# Patient Record
Sex: MALE | Race: WHITE | Employment: FULL TIME | URBAN - METROPOLITAN AREA
[De-identification: names, ages, dates, MRNs, and addresses within clinical notes are randomized per-mention and may not be internally consistent; named-entity substitution may affect disease eponyms.]

---

## 2020-11-25 ENCOUNTER — OFFICE VISIT (OUTPATIENT)
Dept: PRIMARY CARE CLINIC | Age: 30
End: 2020-11-25

## 2020-11-25 PROCEDURE — 99211 OFF/OP EST MAY X REQ PHY/QHP: CPT | Performed by: NURSE PRACTITIONER

## 2020-11-25 NOTE — PROGRESS NOTES
Roel Taylor received a viral test for COVID-19. They were educated on isolation and quarantine as appropriate. For any symptoms, they were directed to seek care from their PCP, given contact information to establish with a doctor, directed to an urgent care or the emergency room.

## 2020-11-28 LAB — SARS-COV-2, NAA: NOT DETECTED

## 2025-03-29 ENCOUNTER — APPOINTMENT (OUTPATIENT)
Dept: CT IMAGING | Age: 35
End: 2025-03-29
Payer: COMMERCIAL

## 2025-03-29 ENCOUNTER — HOSPITAL ENCOUNTER (EMERGENCY)
Age: 35
Discharge: HOME | End: 2025-03-29
Attending: STUDENT IN AN ORGANIZED HEALTH CARE EDUCATION/TRAINING PROGRAM
Payer: COMMERCIAL

## 2025-03-29 ENCOUNTER — APPOINTMENT (OUTPATIENT)
Dept: GENERAL RADIOLOGY | Age: 35
End: 2025-03-29
Payer: COMMERCIAL

## 2025-03-29 VITALS
HEIGHT: 72 IN | DIASTOLIC BLOOD PRESSURE: 96 MMHG | SYSTOLIC BLOOD PRESSURE: 134 MMHG | TEMPERATURE: 97.9 F | WEIGHT: 315 LBS | OXYGEN SATURATION: 96 % | BODY MASS INDEX: 42.66 KG/M2 | HEART RATE: 94 BPM | RESPIRATION RATE: 16 BRPM

## 2025-03-29 DIAGNOSIS — S42.214A CLOSED NONDISPLACED FRACTURE OF SURGICAL NECK OF RIGHT HUMERUS, UNSPECIFIED FRACTURE MORPHOLOGY, INITIAL ENCOUNTER: Primary | ICD-10-CM

## 2025-03-29 DIAGNOSIS — S52.571A OTHER CLOSED INTRA-ARTICULAR FRACTURE OF DISTAL END OF RIGHT RADIUS, INITIAL ENCOUNTER: ICD-10-CM

## 2025-03-29 PROCEDURE — 73100 X-RAY EXAM OF WRIST: CPT

## 2025-03-29 PROCEDURE — 73030 X-RAY EXAM OF SHOULDER: CPT

## 2025-03-29 PROCEDURE — 99284 EMERGENCY DEPT VISIT MOD MDM: CPT

## 2025-03-29 PROCEDURE — 70450 CT HEAD/BRAIN W/O DYE: CPT

## 2025-03-29 PROCEDURE — 6370000000 HC RX 637 (ALT 250 FOR IP): Performed by: STUDENT IN AN ORGANIZED HEALTH CARE EDUCATION/TRAINING PROGRAM

## 2025-03-29 PROCEDURE — 72125 CT NECK SPINE W/O DYE: CPT

## 2025-03-29 PROCEDURE — 29125 APPL SHORT ARM SPLINT STATIC: CPT

## 2025-03-29 RX ORDER — OXYCODONE HYDROCHLORIDE 10 MG/1
10 TABLET ORAL ONCE
Refills: 0 | Status: COMPLETED | OUTPATIENT
Start: 2025-03-29 | End: 2025-03-29

## 2025-03-29 RX ORDER — ONDANSETRON 4 MG/1
4 TABLET, ORALLY DISINTEGRATING ORAL ONCE
Status: COMPLETED | OUTPATIENT
Start: 2025-03-29 | End: 2025-03-29

## 2025-03-29 RX ORDER — MELOXICAM 15 MG/1
15 TABLET ORAL DAILY
Qty: 7 TABLET | Refills: 0 | Status: ON HOLD | OUTPATIENT
Start: 2025-03-29 | End: 2025-04-02 | Stop reason: HOSPADM

## 2025-03-29 RX ADMIN — ONDANSETRON 4 MG: 4 TABLET, ORALLY DISINTEGRATING ORAL at 22:11

## 2025-03-29 RX ADMIN — OXYCODONE HYDROCHLORIDE 10 MG: 10 TABLET ORAL at 22:11

## 2025-03-29 ASSESSMENT — PAIN DESCRIPTION - DESCRIPTORS: DESCRIPTORS: ACHING

## 2025-03-29 ASSESSMENT — LIFESTYLE VARIABLES
HOW MANY STANDARD DRINKS CONTAINING ALCOHOL DO YOU HAVE ON A TYPICAL DAY: 1 OR 2
HOW OFTEN DO YOU HAVE A DRINK CONTAINING ALCOHOL: MONTHLY OR LESS

## 2025-03-29 ASSESSMENT — PAIN SCALES - GENERAL
PAINLEVEL_OUTOF10: 2
PAINLEVEL_OUTOF10: 7
PAINLEVEL_OUTOF10: 3

## 2025-03-29 ASSESSMENT — PAIN DESCRIPTION - PAIN TYPE: TYPE: ACUTE PAIN

## 2025-03-29 ASSESSMENT — PAIN DESCRIPTION - LOCATION
LOCATION: WRIST;SHOULDER
LOCATION: ARM

## 2025-03-29 ASSESSMENT — PAIN DESCRIPTION - ORIENTATION
ORIENTATION: RIGHT;LEFT
ORIENTATION: RIGHT;LEFT

## 2025-03-29 ASSESSMENT — PAIN - FUNCTIONAL ASSESSMENT: PAIN_FUNCTIONAL_ASSESSMENT: 0-10
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
## 2025-03-30 NOTE — ED NOTES
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
## 2025-03-30 NOTE — DISCHARGE INSTRUCTIONS
Thank you for trusting us with your care, it was nice to meet you, as we discussed please follow-up with orthopedic surgery at the number on your discharge papers.  Return to the ED if you have worsening pain, loss of sensation or numbness or other symptoms you find concerning for emergent illness or injury.  A prescription has been sent to your pharmacy for Mobic, you can take this once a day for pain, Mobic is an NSAID, do not take other NSAIDs such as ibuprofen or naproxen while taking Mobic.  You can take Tylenol for breakthrough pain.

## 2025-03-30 NOTE — ED NOTES
Splint completed per RN and ED tech and Dr. Subramanian checked splint. Capillary refill brisk, no loss of sensation noted. Sling and swathe applied per RN and ED tech.

## 2025-03-31 ENCOUNTER — TELEPHONE (OUTPATIENT)
Dept: ORTHOPEDIC SURGERY | Age: 35
End: 2025-03-31

## 2025-03-31 ENCOUNTER — ANESTHESIA EVENT (OUTPATIENT)
Dept: OPERATING ROOM | Age: 35
End: 2025-03-31
Payer: COMMERCIAL

## 2025-03-31 ENCOUNTER — PREP FOR PROCEDURE (OUTPATIENT)
Dept: ORTHOPEDIC SURGERY | Age: 35
End: 2025-03-31

## 2025-03-31 DIAGNOSIS — S52.502A CLOSED FRACTURE OF DISTAL END OF LEFT RADIUS, UNSPECIFIED FRACTURE MORPHOLOGY, INITIAL ENCOUNTER: Primary | ICD-10-CM

## 2025-03-31 DIAGNOSIS — S42.201A CLOSED FRACTURE OF PROXIMAL END OF RIGHT HUMERUS, UNSPECIFIED FRACTURE MORPHOLOGY, INITIAL ENCOUNTER: Primary | ICD-10-CM

## 2025-03-31 PROBLEM — S62.102A LEFT WRIST FRACTURE: Status: ACTIVE | Noted: 2025-03-31

## 2025-03-31 RX ORDER — ATORVASTATIN CALCIUM 80 MG/1
80 TABLET, FILM COATED ORAL DAILY
COMMUNITY
Start: 2025-01-20

## 2025-03-31 RX ORDER — OXYCODONE HYDROCHLORIDE 5 MG/1
5-10 TABLET ORAL EVERY 6 HOURS PRN
Qty: 30 TABLET | Refills: 0 | Status: SHIPPED | OUTPATIENT
Start: 2025-03-31 | End: 2025-04-07

## 2025-03-31 RX ORDER — TIRZEPATIDE 12.5 MG/.5ML
INJECTION, SOLUTION SUBCUTANEOUS
COMMUNITY
Start: 2025-01-18

## 2025-03-31 NOTE — PROGRESS NOTES
Spoke with Dr Jesus , he is okay with last dose of Mounjaro being 3/30 since this is emergent surgery.    [facility] PRE-OPERATIVE INSTRUCTIONS    Day of Procedure:  4/2              Arrival time:   1240             Surgery time:1440    Take the following medications with a sip of water:  Follow your MD/Surgeons pre-procedure instructions regarding your medications     Do not eat or drink anything after 12:00 midnight prior to your surgery.  This includes water, chewing gum, mints and ice chips.   You may brush your teeth and gargle the morning of your surgery, but do not swallow the water.     Please see your family doctor/pediatrician for a history and physical and/or concerning medications.   Bring any test results/reports from your physicians office.   If you are under the care of a heart doctor or specialist doctor, please be aware that you may be asked to see them for clearance.    You may be asked to stop blood thinners such as Coumadin, Plavix, Fragmin, Lovenox, etc., or any anti-inflammatories such as:  Aspirin, Ibuprofen, Advil, Naproxen prior to your surgery.    We also ask that you stop any over the counter medications such as fish oil, vitamin E, glucosamine, garlic, Multivitamins, COQ 10, etc.    We ask that you do not smoke 24 hours prior to surgery.  We ask that you do not  drink any alcoholic beverages 24 hours prior to surgery     You must make arrangements for a responsible adult to take you home after your surgery.    For your safety, you will not be allowed to leave alone or drive yourself home.  Your surgery will be cancelled if you do not have a ride home.     Also for your safety, you must have someone stay with you the first 24 hours after your surgery.     A parent or legal guardian must accompany a child scheduled for surgery and plan to stay at the hospital until the child is discharged.    Please do not bring other children with you.    For your comfort, please wear simple loose

## 2025-03-31 NOTE — TELEPHONE ENCOUNTER
I scheduled the patient for an appointment with Dr. Gayle tomorrow morning. The patient has a left wrist and right shoulder fracture. The ER only gave him Meloxicam for pain. The patient is asking for pain medication. I informed him I would ask Dr. Gayle.     Message sent to Dr. Gayle.

## 2025-04-01 ENCOUNTER — OFFICE VISIT (OUTPATIENT)
Dept: ORTHOPEDIC SURGERY | Age: 35
End: 2025-04-01

## 2025-04-01 VITALS — HEIGHT: 72 IN | BODY MASS INDEX: 42.66 KG/M2 | WEIGHT: 315 LBS

## 2025-04-01 DIAGNOSIS — S52.502A CLOSED FRACTURE OF DISTAL END OF LEFT RADIUS, UNSPECIFIED FRACTURE MORPHOLOGY, INITIAL ENCOUNTER: Primary | ICD-10-CM

## 2025-04-01 DIAGNOSIS — S42.201A CLOSED FRACTURE OF PROXIMAL END OF RIGHT HUMERUS, UNSPECIFIED FRACTURE MORPHOLOGY, INITIAL ENCOUNTER: ICD-10-CM
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
## 2025-04-02 ENCOUNTER — APPOINTMENT (OUTPATIENT)
Dept: GENERAL RADIOLOGY | Age: 35
End: 2025-04-02
Attending: ORTHOPAEDIC SURGERY
Payer: COMMERCIAL

## 2025-04-02 ENCOUNTER — ANESTHESIA (OUTPATIENT)
Dept: OPERATING ROOM | Age: 35
End: 2025-04-02
Payer: COMMERCIAL

## 2025-04-02 ENCOUNTER — HOSPITAL ENCOUNTER (OUTPATIENT)
Age: 35
Setting detail: OUTPATIENT SURGERY
Discharge: HOME | End: 2025-04-02
Attending: ORTHOPAEDIC SURGERY | Admitting: ORTHOPAEDIC SURGERY
Payer: COMMERCIAL

## 2025-04-02 VITALS
OXYGEN SATURATION: 96 % | HEIGHT: 72 IN | DIASTOLIC BLOOD PRESSURE: 87 MMHG | SYSTOLIC BLOOD PRESSURE: 130 MMHG | WEIGHT: 315 LBS | TEMPERATURE: 99.7 F | RESPIRATION RATE: 18 BRPM | BODY MASS INDEX: 42.66 KG/M2 | HEART RATE: 109 BPM

## 2025-04-02 DIAGNOSIS — S52.502A CLOSED FRACTURE OF DISTAL END OF LEFT RADIUS, UNSPECIFIED FRACTURE MORPHOLOGY, INITIAL ENCOUNTER: ICD-10-CM

## 2025-04-02 LAB
GLUCOSE BLD-MCNC: 127 MG/DL (ref 70–99)
GLUCOSE BLD-MCNC: 131 MG/DL (ref 70–99)
PERFORMED ON: ABNORMAL
PERFORMED ON: ABNORMAL

## 2025-04-02 PROCEDURE — 2580000003 HC RX 258: Performed by: ORTHOPAEDIC SURGERY

## 2025-04-02 PROCEDURE — 2709999900 HC NON-CHARGEABLE SUPPLY: Performed by: ORTHOPAEDIC SURGERY

## 2025-04-02 PROCEDURE — 7100000011 HC PHASE II RECOVERY - ADDTL 15 MIN: Performed by: ORTHOPAEDIC SURGERY

## 2025-04-02 PROCEDURE — 3600000004 HC SURGERY LEVEL 4 BASE: Performed by: ORTHOPAEDIC SURGERY

## 2025-04-02 PROCEDURE — 6360000002 HC RX W HCPCS: Performed by: NURSE ANESTHETIST, CERTIFIED REGISTERED

## 2025-04-02 PROCEDURE — 7100000000 HC PACU RECOVERY - FIRST 15 MIN: Performed by: ORTHOPAEDIC SURGERY

## 2025-04-02 PROCEDURE — 7100000001 HC PACU RECOVERY - ADDTL 15 MIN: Performed by: ORTHOPAEDIC SURGERY

## 2025-04-02 PROCEDURE — 6370000000 HC RX 637 (ALT 250 FOR IP): Performed by: ANESTHESIOLOGY

## 2025-04-02 PROCEDURE — 6360000002 HC RX W HCPCS: Performed by: ORTHOPAEDIC SURGERY

## 2025-04-02 PROCEDURE — 3600000014 HC SURGERY LEVEL 4 ADDTL 15MIN: Performed by: ORTHOPAEDIC SURGERY

## 2025-04-02 PROCEDURE — 3700000001 HC ADD 15 MINUTES (ANESTHESIA): Performed by: ORTHOPAEDIC SURGERY

## 2025-04-02 PROCEDURE — C1713 ANCHOR/SCREW BN/BN,TIS/BN: HCPCS | Performed by: ORTHOPAEDIC SURGERY

## 2025-04-02 PROCEDURE — 2500000003 HC RX 250 WO HCPCS: Performed by: NURSE ANESTHETIST, CERTIFIED REGISTERED

## 2025-04-02 PROCEDURE — 2720000010 HC SURG SUPPLY STERILE: Performed by: ORTHOPAEDIC SURGERY

## 2025-04-02 PROCEDURE — 2500000003 HC RX 250 WO HCPCS: Performed by: ORTHOPAEDIC SURGERY

## 2025-04-02 PROCEDURE — 2580000003 HC RX 258: Performed by: ANESTHESIOLOGY

## 2025-04-02 PROCEDURE — 6360000002 HC RX W HCPCS: Performed by: ANESTHESIOLOGY

## 2025-04-02 PROCEDURE — 3700000000 HC ANESTHESIA ATTENDED CARE: Performed by: ORTHOPAEDIC SURGERY

## 2025-04-02 PROCEDURE — C1769 GUIDE WIRE: HCPCS | Performed by: ORTHOPAEDIC SURGERY

## 2025-04-02 PROCEDURE — 7100000010 HC PHASE II RECOVERY - FIRST 15 MIN: Performed by: ORTHOPAEDIC SURGERY

## 2025-04-02 PROCEDURE — 73100 X-RAY EXAM OF WRIST: CPT

## 2025-04-02 DEVICE — BONE SCREW, FULLY THREADED, T8
Type: IMPLANTABLE DEVICE | Site: WRIST | Status: FUNCTIONAL
Brand: VARIAX

## 2025-04-02 DEVICE — LOCKING SCREW, FULLY THREADED,T8
Type: IMPLANTABLE DEVICE | Site: WRIST | Status: FUNCTIONAL
Brand: VARIAX

## 2025-04-02 DEVICE — VOLAR PLATE INTERMEDIATE LEFT, LONG
Type: IMPLANTABLE DEVICE | Site: WRIST | Status: FUNCTIONAL
Brand: VARIAX

## 2025-04-02 RX ORDER — BUPIVACAINE HYDROCHLORIDE 5 MG/ML
INJECTION, SOLUTION EPIDURAL; INTRACAUDAL; PERINEURAL
Status: DISCONTINUED | OUTPATIENT
Start: 2025-04-02 | End: 2025-04-02 | Stop reason: ALTCHOICE

## 2025-04-02 RX ORDER — KETOROLAC TROMETHAMINE 30 MG/ML
INJECTION, SOLUTION INTRAMUSCULAR; INTRAVENOUS
Status: DISCONTINUED | OUTPATIENT
Start: 2025-04-02 | End: 2025-04-02 | Stop reason: SDUPTHER

## 2025-04-02 RX ORDER — MAGNESIUM HYDROXIDE 1200 MG/15ML
LIQUID ORAL CONTINUOUS PRN
Status: COMPLETED | OUTPATIENT
Start: 2025-04-02 | End: 2025-04-02

## 2025-04-02 RX ORDER — ESMOLOL HYDROCHLORIDE 10 MG/ML
INJECTION INTRAVENOUS
Status: DISCONTINUED | OUTPATIENT
Start: 2025-04-02 | End: 2025-04-02 | Stop reason: SDUPTHER

## 2025-04-02 RX ORDER — OXYCODONE HYDROCHLORIDE 5 MG/1
5 TABLET ORAL PRN
Status: COMPLETED | OUTPATIENT
Start: 2025-04-02 | End: 2025-04-02

## 2025-04-02 RX ORDER — ONDANSETRON 2 MG/ML
INJECTION INTRAMUSCULAR; INTRAVENOUS
Status: DISCONTINUED | OUTPATIENT
Start: 2025-04-02 | End: 2025-04-02 | Stop reason: SDUPTHER

## 2025-04-02 RX ORDER — SODIUM CHLORIDE 9 MG/ML
INJECTION, SOLUTION INTRAVENOUS PRN
Status: DISCONTINUED | OUTPATIENT
Start: 2025-04-02 | End: 2025-04-02 | Stop reason: HOSPADM

## 2025-04-02 RX ORDER — ROCURONIUM BROMIDE 10 MG/ML
INJECTION, SOLUTION INTRAVENOUS
Status: DISCONTINUED | OUTPATIENT
Start: 2025-04-02 | End: 2025-04-02 | Stop reason: SDUPTHER

## 2025-04-02 RX ORDER — SODIUM CHLORIDE 0.9 % (FLUSH) 0.9 %
5-40 SYRINGE (ML) INJECTION PRN
Status: DISCONTINUED | OUTPATIENT
Start: 2025-04-02 | End: 2025-04-02 | Stop reason: HOSPADM

## 2025-04-02 RX ORDER — FENTANYL CITRATE 50 UG/ML
INJECTION, SOLUTION INTRAMUSCULAR; INTRAVENOUS
Status: DISCONTINUED | OUTPATIENT
Start: 2025-04-02 | End: 2025-04-02 | Stop reason: SDUPTHER

## 2025-04-02 RX ORDER — SUCCINYLCHOLINE/SOD CL,ISO/PF 200MG/10ML
SYRINGE (ML) INTRAVENOUS
Status: DISCONTINUED | OUTPATIENT
Start: 2025-04-02 | End: 2025-04-02 | Stop reason: SDUPTHER

## 2025-04-02 RX ORDER — GLYCOPYRROLATE 0.2 MG/ML
INJECTION INTRAMUSCULAR; INTRAVENOUS
Status: DISCONTINUED | OUTPATIENT
Start: 2025-04-02 | End: 2025-04-02 | Stop reason: SDUPTHER

## 2025-04-02 RX ORDER — FENTANYL CITRATE 50 UG/ML
25 INJECTION, SOLUTION INTRAMUSCULAR; INTRAVENOUS EVERY 5 MIN PRN
Status: DISCONTINUED | OUTPATIENT
Start: 2025-04-02 | End: 2025-04-02 | Stop reason: HOSPADM

## 2025-04-02 RX ORDER — LABETALOL HYDROCHLORIDE 5 MG/ML
10 INJECTION, SOLUTION INTRAVENOUS ONCE
Status: COMPLETED | OUTPATIENT
Start: 2025-04-02 | End: 2025-04-02

## 2025-04-02 RX ORDER — SODIUM CHLORIDE 0.9 % (FLUSH) 0.9 %
5-40 SYRINGE (ML) INJECTION EVERY 12 HOURS SCHEDULED
Status: DISCONTINUED | OUTPATIENT
Start: 2025-04-02 | End: 2025-04-02 | Stop reason: HOSPADM

## 2025-04-02 RX ORDER — LABETALOL HYDROCHLORIDE 5 MG/ML
INJECTION, SOLUTION INTRAVENOUS
Status: DISCONTINUED | OUTPATIENT
Start: 2025-04-02 | End: 2025-04-02 | Stop reason: SDUPTHER

## 2025-04-02 RX ORDER — ONDANSETRON 2 MG/ML
4 INJECTION INTRAMUSCULAR; INTRAVENOUS
Status: DISCONTINUED | OUTPATIENT
Start: 2025-04-02 | End: 2025-04-02 | Stop reason: HOSPADM

## 2025-04-02 RX ORDER — OXYCODONE HYDROCHLORIDE 10 MG/1
10 TABLET ORAL PRN
Status: COMPLETED | OUTPATIENT
Start: 2025-04-02 | End: 2025-04-02

## 2025-04-02 RX ORDER — LIDOCAINE HYDROCHLORIDE 20 MG/ML
INJECTION, SOLUTION EPIDURAL; INFILTRATION; INTRACAUDAL; PERINEURAL
Status: DISCONTINUED | OUTPATIENT
Start: 2025-04-02 | End: 2025-04-02 | Stop reason: SDUPTHER

## 2025-04-02 RX ORDER — DEXAMETHASONE SODIUM PHOSPHATE 4 MG/ML
INJECTION, SOLUTION INTRA-ARTICULAR; INTRALESIONAL; INTRAMUSCULAR; INTRAVENOUS; SOFT TISSUE
Status: DISCONTINUED | OUTPATIENT
Start: 2025-04-02 | End: 2025-04-02 | Stop reason: SDUPTHER

## 2025-04-02 RX ORDER — NALOXONE HYDROCHLORIDE 0.4 MG/ML
INJECTION, SOLUTION INTRAMUSCULAR; INTRAVENOUS; SUBCUTANEOUS PRN
Status: DISCONTINUED | OUTPATIENT
Start: 2025-04-02 | End: 2025-04-02 | Stop reason: HOSPADM

## 2025-04-02 RX ORDER — PROPOFOL 10 MG/ML
INJECTION, EMULSION INTRAVENOUS
Status: DISCONTINUED | OUTPATIENT
Start: 2025-04-02 | End: 2025-04-02 | Stop reason: SDUPTHER

## 2025-04-02 RX ADMIN — PROPOFOL 200 MG: 10 INJECTION, EMULSION INTRAVENOUS at 14:40

## 2025-04-02 RX ADMIN — ROCURONIUM BROMIDE 5 MG: 10 SOLUTION INTRAVENOUS at 14:40

## 2025-04-02 RX ADMIN — ROCURONIUM BROMIDE 35 MG: 10 SOLUTION INTRAVENOUS at 14:48

## 2025-04-02 RX ADMIN — ESMOLOL HYDROCHLORIDE 30 MG: 10 INJECTION, SOLUTION INTRAVENOUS at 15:05

## 2025-04-02 RX ADMIN — SODIUM CHLORIDE: 9 INJECTION, SOLUTION INTRAVENOUS at 14:34

## 2025-04-02 RX ADMIN — LIDOCAINE HYDROCHLORIDE 100 MG: 20 INJECTION, SOLUTION EPIDURAL; INFILTRATION; INTRACAUDAL; PERINEURAL at 14:40

## 2025-04-02 RX ADMIN — KETOROLAC TROMETHAMINE 30 MG: 30 INJECTION, SOLUTION INTRAMUSCULAR at 15:46

## 2025-04-02 RX ADMIN — HYDROMORPHONE HYDROCHLORIDE 0.5 MG: 1 INJECTION, SOLUTION INTRAMUSCULAR; INTRAVENOUS; SUBCUTANEOUS at 16:30

## 2025-04-02 RX ADMIN — Medication 180 MG: at 14:40

## 2025-04-02 RX ADMIN — GLYCOPYRROLATE 0.2 MG: 0.2 INJECTION INTRAMUSCULAR; INTRAVENOUS at 14:35

## 2025-04-02 RX ADMIN — ONDANSETRON 4 MG: 2 INJECTION INTRAMUSCULAR; INTRAVENOUS at 14:48

## 2025-04-02 RX ADMIN — LABETALOL HYDROCHLORIDE 5 MG: 5 INJECTION, SOLUTION INTRAVENOUS at 15:22

## 2025-04-02 RX ADMIN — LABETALOL HYDROCHLORIDE 5 MG: 5 INJECTION, SOLUTION INTRAVENOUS at 15:42

## 2025-04-02 RX ADMIN — ESMOLOL HYDROCHLORIDE 20 MG: 10 INJECTION, SOLUTION INTRAVENOUS at 14:58

## 2025-04-02 RX ADMIN — HYDROMORPHONE HYDROCHLORIDE 0.5 MG: 1 INJECTION, SOLUTION INTRAMUSCULAR; INTRAVENOUS; SUBCUTANEOUS at 15:01

## 2025-04-02 RX ADMIN — LABETALOL HYDROCHLORIDE 10 MG: 5 INJECTION INTRAVENOUS at 16:41

## 2025-04-02 RX ADMIN — SODIUM CHLORIDE 3000 MG: 9 INJECTION, SOLUTION INTRAVENOUS at 14:45

## 2025-04-02 RX ADMIN — FENTANYL CITRATE 50 MCG: 50 INJECTION INTRAMUSCULAR; INTRAVENOUS at 14:35

## 2025-04-02 RX ADMIN — DEXAMETHASONE SODIUM PHOSPHATE 4 MG: 4 INJECTION, SOLUTION INTRAMUSCULAR; INTRAVENOUS at 14:48

## 2025-04-02 RX ADMIN — SUGAMMADEX 200 MG: 100 INJECTION, SOLUTION INTRAVENOUS at 15:55

## 2025-04-02 RX ADMIN — OXYCODONE 5 MG: 5 TABLET ORAL at 17:16

## 2025-04-02 RX ADMIN — FENTANYL CITRATE 50 MCG: 50 INJECTION INTRAMUSCULAR; INTRAVENOUS at 14:40

## 2025-04-02 RX ADMIN — HYDROMORPHONE HYDROCHLORIDE 0.5 MG: 1 INJECTION, SOLUTION INTRAMUSCULAR; INTRAVENOUS; SUBCUTANEOUS at 14:52

## 2025-04-02 ASSESSMENT — PAIN DESCRIPTION - PAIN TYPE
TYPE: SURGICAL PAIN
TYPE: SURGICAL PAIN
TYPE: ACUTE PAIN;SURGICAL PAIN

## 2025-04-02 ASSESSMENT — PAIN SCALES - GENERAL
PAINLEVEL_OUTOF10: 5
PAINLEVEL_OUTOF10: 4
PAINLEVEL_OUTOF10: 7

## 2025-04-02 ASSESSMENT — PAIN DESCRIPTION - ONSET
ONSET: ON-GOING

## 2025-04-02 ASSESSMENT — PAIN - FUNCTIONAL ASSESSMENT
PAIN_FUNCTIONAL_ASSESSMENT: PREVENTS OR INTERFERES SOME ACTIVE ACTIVITIES AND ADLS
PAIN_FUNCTIONAL_ASSESSMENT: 0-10
PAIN_FUNCTIONAL_ASSESSMENT: PREVENTS OR INTERFERES SOME ACTIVE ACTIVITIES AND ADLS

## 2025-04-02 ASSESSMENT — PAIN DESCRIPTION - DESCRIPTORS
DESCRIPTORS: ACHING

## 2025-04-02 ASSESSMENT — PAIN DESCRIPTION - ORIENTATION
ORIENTATION: LEFT

## 2025-04-02 ASSESSMENT — PAIN DESCRIPTION - FREQUENCY
FREQUENCY: CONTINUOUS

## 2025-04-02 ASSESSMENT — PAIN DESCRIPTION - LOCATION
LOCATION: ARM

## 2025-04-02 ASSESSMENT — ENCOUNTER SYMPTOMS: SHORTNESS OF BREATH: 0

## 2025-04-02 NOTE — PROGRESS NOTES
IV discontinued.  Pt assisted in dressing.  DC instructions reviewed with pt and sister - both VU. Copy sent home with pt.  Pt requesting sling - simple sling applied to left arm.  DC to home with sister to transport.

## 2025-04-02 NOTE — PROGRESS NOTES
Pt to PACU from OR. Pt vital signs WNL, on 4L O2 via nasal cannula. CRNA and Anaesthesia aware of HTN; no new orders at this time. Pt asleep but opens eyes to speech and touch. Pt unable to answer if feeling pain. IV infusing well. Dressing to left arm and sling to right arm; clean, dry and intact. Warm blanket applied for comfort.

## 2025-04-02 NOTE — OP NOTE
Operative Note      Patient: Mihai Sharma  YOB: 1990  MRN: [id number]    Date of Procedure: 4/2/2025    Pre-Op Diagnosis Codes:      * Left wrist fracture [S62.102A]    Post-Op Diagnosis: Comminuted intra-articular left distal radius fracture       Procedure(s):  Open reduction internal fixation- left wrist    Surgeon(s):  Mauricio Gayle MD    Assistant:   Surgical Assistant: Cirilo Lao; Fanta Yeung    Anesthesia: General    Estimated Blood Loss (mL): less than 50     Complications: None    Specimens:   * No specimens in log *    Implants:  * No implants in log *      Drains: * No LDAs found *    Findings:  Infection Present At Time Of Surgery (PATOS) (choose all levels that have infection present):  No infection present  Other Findings: Moderate comminution noted at the fracture site.    Detailed Description of Procedure:   PATIENT NAME:                     Mihai Sharma  YOB: 1990   MEDICAL RECORD#         [id number]  SURGERY DATE:         4/2/2025  SURGEON:                 Mauricio Gayle MD    PREOPERATIVE DIAGNOSIS: Comminuted displaced left distal radius fracture with associated ulnar styloid fracture.     POSTOPERATIVE DIAGNOSIS: Comminuted intra-articular displaced left distal radius fracture with associated ulnar styloid fracture. The distal radius fracture involved 4 main parts.     PROCEDURE: Open reduction internal fixation of the left distal radius Fracture.  #2 biplanar C arm fluoroscopic evaluation and interpretation.    ANESTHESIA: General anesthesia.     IV FLUIDS: Crystalloid.     ESTIMATED BLOOD LOSS: 50 mL.     COMPLICATIONS: None. The patient tolerated the procedure quite well.     INDICATIONS: The patient is a 34 y.o. male who reportedly fell on the left outstretched upper extremity. The patient presented to my office and was found to have a comminuted displaced distal radius fracture. Due to the nature of the injury, the patient was

## 2025-04-02 NOTE — PROGRESS NOTES
Pt vitals WNL on room air. Pt states pain is at a manageable level and denies nausea. Pt awake and alert, A&O x4. Dressing to left arm; clean, dry and intact. Report called to phase 2.

## 2025-04-02 NOTE — DISCHARGE INSTRUCTIONS
Outpatient Post-Operative Discharge Instructions for ORIF Wrist  Call your surgeon’s office today to schedule your follow-up appointment if not scheduled already: [phone]. See Dr. Gayle in 7-10 days.  Resume your normal activities as you begin to feel better, unless otherwise instructed by your physician.  Walking restrictions: None  Driving restrictions:  Other:  see #5.   Do not drive while taking pain medication or until 24 hours after anesthesia.  Diet instructions: Start with clear liquids, progress to a light diet, then to a normal diet as tolerated. Take only clear liquids if nauseated or vomiting.  Avoid alcoholic beverages and major decision making for 24 hours after anesthesia.  Notify your physician if you have:  Excessive bleeding, drainage, redness, or other problems at the surgical site.  Persistent nausea, vomiting, or diarrhea  Severe pain after taking prescribed pain medication  Hives, rash, or itching.  Numbness or tingling, increased pain, or bluish, white, cool extremities around a cast, ace bandage or dressing.  Temperature over 100 degrees F.  If you cannot reach your physician for any concern, please go with this paper to an emergency facility nearest you.  Medication instructions:  o None  o Prescription given  o Medication reconciliation sheet given to patient  Special Instructions:  Rest today                    Ice packs to wrist  Frequency: every 2 hours for 72 hours  Elevate hand/wrist                                    Above level of heart for: 72 hours  Keep bandage dry and clean until follow up visit.        Use arm sling for comfort.  Leave bandage on and intact until follow up.   May split dressing if too tight by removing elastic bandage, cut gauze on back from hand to elbow then reapply elastic bandage more loosely. Move fingers, elbow, and shoulder. May shower with arm covered in plastic bag.   I have received and reviewed these instructions with the nurse and I understand

## 2025-04-02 NOTE — ANESTHESIA PRE PROCEDURE
Department of Anesthesiology  Preprocedure Note       Name:  Mihai Sharma   Age:  34 y.o.  :  1990                                          MRN:  [id number]         Date:  2025      Surgeon: Surgeon(s):  Mauricio Gayle MD    Procedure: Procedure(s):  Open reduction internal fixation- left wrist    Medications prior to admission:   Prior to Admission medications    Medication Sig Start Date End Date Taking? Authorizing Provider   oxyCODONE (ROXICODONE) 5 MG immediate release tablet Take 1-2 tablets by mouth every 6 hours as needed for Pain for up to 7 days. Max Daily Amount: 40 mg 3/31/25 4/7/25 Yes Mauricio Gayle MD   MOUNJARO 12.5 MG/0.5ML SOAJ Injected last 3/30 aware needs to be stopped 7 days prior 25  Yes Provider, MD Tomasa   atorvastatin (LIPITOR) 80 MG tablet Take 1 tablet by mouth daily Has not taken recently lost bottle 25  Yes Provider, MD Tomasa   diphenhydrAMINE-APAP, sleep, (TYLENOL PM EXTRA STRENGTH)  MG tablet Take 1 tablet by mouth as needed for Sleep   Yes Provider, MD Tomasa   naloxegol (MOVANTIK) 25 MG TABS tablet Take 1 tablet by mouth every morning (before breakfast) 25   Mauricio Gayle MD   meloxicam (MOBIC) 15 MG tablet Take 1 tablet by mouth daily 3/29/25   Virgilio Subramanian MD       Current medications:    Current Facility-Administered Medications   Medication Dose Route Frequency Provider Last Rate Last Admin    sodium chloride flush 0.9 % injection 5-40 mL  5-40 mL IntraVENous 2 times per day Lisa Wagoner MD        sodium chloride flush 0.9 % injection 5-40 mL  5-40 mL IntraVENous PRN Lisa Wagoner MD        0.9 % sodium chloride infusion   IntraVENous PRN Lisa Wagoner MD        ceFAZolin (ANCEF) 3,000 mg in sodium chloride 0.9 % 100 mL (addEASE)  3,000 mg IntraVENous On Call to OR Mauricio Gayle MD           Allergies:  No Known Allergies    Problem List:    Patient Active Problem List

## 2025-04-02 NOTE — ANESTHESIA POSTPROCEDURE EVALUATION
Department of Anesthesiology  Postprocedure Note    Patient: Mihai Sharma  MRN: [id number]  YOB: 1990  Date of evaluation: 4/2/2025    Procedure Summary       Date: 04/02/25 Room / Location: [address]    Anesthesia Start: 1435 Anesthesia Stop: 1616    Procedure: Open reduction internal fixation- left wrist (Left: Wrist) Diagnosis:       Left wrist fracture      (Left wrist fracture [S62.102A])    Surgeons: Mauricio Gayle MD Responsible Provider: Lisa Wagoner MD    Anesthesia Type: general ASA Status: 3            Anesthesia Type: No value filed.    Adriana Phase I: Adriana Score: 10    Adriana Phase II: Adriana Score: 10    Anesthesia Post Evaluation    Patient location during evaluation: PACU  Patient participation: complete - patient participated  Level of consciousness: awake and alert  Airway patency: patent  Nausea & Vomiting: no nausea and no vomiting  Cardiovascular status: hemodynamically stable  Respiratory status: acceptable  Hydration status: stable  Pain management: adequate    No notable events documented.

## 2025-04-07 DIAGNOSIS — S42.201A CLOSED FRACTURE OF PROXIMAL END OF RIGHT HUMERUS, UNSPECIFIED FRACTURE MORPHOLOGY, INITIAL ENCOUNTER: ICD-10-CM

## 2025-04-07 RX ORDER — OXYCODONE HYDROCHLORIDE 5 MG/1
5-10 TABLET ORAL
Qty: 30 TABLET | Refills: 0 | Status: SHIPPED | OUTPATIENT
Start: 2025-04-07 | End: 2025-04-14

## 2025-04-07 NOTE — TELEPHONE ENCOUNTER
Prescription Refill     Medication Name:  OXYCODONE  Pharmacy: [facility] [address]  Patient Contact Number:  [phone]

## 2025-04-09 ENCOUNTER — OFFICE VISIT (OUTPATIENT)
Dept: ORTHOPEDIC SURGERY | Age: 35
End: 2025-04-09

## 2025-04-09 VITALS — WEIGHT: 315 LBS | HEIGHT: 72 IN | BODY MASS INDEX: 42.66 KG/M2

## 2025-04-09 DIAGNOSIS — S42.201A CLOSED FRACTURE OF PROXIMAL END OF RIGHT HUMERUS, UNSPECIFIED FRACTURE MORPHOLOGY, INITIAL ENCOUNTER: Primary | ICD-10-CM

## 2025-04-09 DIAGNOSIS — Z87.81 S/P ORIF (OPEN REDUCTION INTERNAL FIXATION) FRACTURE: ICD-10-CM

## 2025-04-09 DIAGNOSIS — S52.502A CLOSED FRACTURE OF DISTAL END OF LEFT RADIUS, UNSPECIFIED FRACTURE MORPHOLOGY, INITIAL ENCOUNTER: ICD-10-CM

## 2025-04-09 DIAGNOSIS — Z98.890 S/P ORIF (OPEN REDUCTION INTERNAL FIXATION) FRACTURE: ICD-10-CM

## 2025-04-09 NOTE — PROGRESS NOTES
Verbal and written instructions for the use of and application of this item were provided.   They were instructed to contact the office immediately should the brace result in increased pain, decreased sensation, increased swelling or worsening of the condition.

## 2025-04-10 ENCOUNTER — TELEPHONE (OUTPATIENT)
Dept: ORTHOPEDIC SURGERY | Age: 35
End: 2025-04-10

## 2025-04-10 NOTE — TELEPHONE ENCOUNTER
Call sent to triage     Patient calling with concerns about bruising that has popped up on his hand and wrist, states he just wants to make sure this is normal and something common with hand injuries, I did reassure him that this does appear normal and that residual bruising will happen, made aware I would send message to Dr. Gayle and someone will give him a call back     [phone]- Mmvzc

## 2025-04-14 ENCOUNTER — PATIENT MESSAGE (OUTPATIENT)
Dept: ORTHOPEDIC SURGERY | Age: 35
End: 2025-04-14

## 2025-04-14 DIAGNOSIS — S42.201A CLOSED FRACTURE OF PROXIMAL END OF RIGHT HUMERUS, UNSPECIFIED FRACTURE MORPHOLOGY, INITIAL ENCOUNTER: ICD-10-CM

## 2025-04-14 RX ORDER — OXYCODONE HYDROCHLORIDE 5 MG/1
5-10 TABLET ORAL
Qty: 30 TABLET | Refills: 0 | Status: CANCELLED | OUTPATIENT
Start: 2025-04-14 | End: 2025-04-21

## 2025-04-15 RX ORDER — CYCLOBENZAPRINE HCL 10 MG
10 TABLET ORAL 2 TIMES DAILY PRN
Qty: 20 TABLET | Refills: 0 | Status: SHIPPED | OUTPATIENT
Start: 2025-04-15 | End: 2025-04-25

## 2025-04-16 DIAGNOSIS — S42.201A CLOSED FRACTURE OF PROXIMAL END OF RIGHT HUMERUS, UNSPECIFIED FRACTURE MORPHOLOGY, INITIAL ENCOUNTER: ICD-10-CM

## 2025-04-16 RX ORDER — OXYCODONE HYDROCHLORIDE 5 MG/1
5-10 TABLET ORAL EVERY 8 HOURS PRN
Qty: 30 TABLET | Refills: 0 | Status: SHIPPED | OUTPATIENT
Start: 2025-04-16 | End: 2025-04-23

## 2025-04-16 NOTE — TELEPHONE ENCOUNTER
Noted in appt note to make a disc at his appt.          4/16/2025 11:43 AM Leah Urbina       Comment: I just spoke to the patient and he is asking for his pre op images and post op images to  at his appointment on 4/29/25

## 2025-04-16 NOTE — TELEPHONE ENCOUNTER
Prescription Refill      Medication Name: oxycodone  Pharm[facility]: [facility]  Patient Contact Number: [phone]

## 2025-04-24 ENCOUNTER — TELEPHONE (OUTPATIENT)
Dept: ORTHOPEDIC SURGERY | Age: 35
End: 2025-04-24

## 2025-04-25 RX ORDER — CYCLOBENZAPRINE HCL 10 MG
10 TABLET ORAL 2 TIMES DAILY PRN
Qty: 20 TABLET | Refills: 0 | OUTPATIENT
Start: 2025-04-25 | End: 2025-05-05

## 2025-04-28 NOTE — TELEPHONE ENCOUNTER
Patient has an appt tomorrow. He may discuss this with Dr. Gayle then. I spoke to pt and let him know.

## 2025-04-29 ENCOUNTER — OFFICE VISIT (OUTPATIENT)
Dept: ORTHOPEDIC SURGERY | Age: 35
End: 2025-04-29

## 2025-04-29 VITALS — WEIGHT: 315 LBS | BODY MASS INDEX: 42.66 KG/M2 | HEIGHT: 72 IN

## 2025-04-29 DIAGNOSIS — Z87.81 S/P ORIF (OPEN REDUCTION INTERNAL FIXATION) FRACTURE: Primary | ICD-10-CM

## 2025-04-29 DIAGNOSIS — Z98.890 S/P ORIF (OPEN REDUCTION INTERNAL FIXATION) FRACTURE: Primary | ICD-10-CM

## 2025-04-29 DIAGNOSIS — S42.201A CLOSED FRACTURE OF PROXIMAL END OF RIGHT HUMERUS, UNSPECIFIED FRACTURE MORPHOLOGY, INITIAL ENCOUNTER: ICD-10-CM

## 2025-04-29 PROCEDURE — 99024 POSTOP FOLLOW-UP VISIT: CPT | Performed by: ORTHOPAEDIC SURGERY

## 2025-04-29 RX ORDER — CYCLOBENZAPRINE HCL 10 MG
10 TABLET ORAL 2 TIMES DAILY PRN
Qty: 20 TABLET | Refills: 0 | Status: SHIPPED | OUTPATIENT
Start: 2025-04-29 | End: 2025-05-09

## 2025-04-29 RX ORDER — OXYCODONE HYDROCHLORIDE 5 MG/1
5-10 TABLET ORAL EVERY 12 HOURS PRN
Qty: 28 TABLET | Refills: 0 | Status: SHIPPED | OUTPATIENT
Start: 2025-04-29 | End: 2025-05-06

## 2025-04-29 NOTE — PROGRESS NOTES
Mihai SANTIAGO Zachary  [id number]  April 29, 2025    Chief Complaint   Patient presents with    Follow-up     Post Op ORIF  DOS 4/2/25       History: The patient is a 34-year-old gentleman who is here in follow-up regarding his right shoulder and his left wrist.  He underwent open reduction and internal fixation of his left distal radius approximately 4 weeks ago.  He also has a nondisplaced right proximal humerus fracture.  The right shoulder pain is improving.  The left wrist pain is improving.  He has been wearing a cock up wrist splint.     The patient's  past medical history, medications, allergies,  family history, social history, and have been reviewed, and dated and are recorded in the chart.  Pertinent items are noted in HPI.  Review of systems reviewed from Pertinent History Form dated on 4/9 and available in the patient's chart under the Media tab.     Vitals:  Ht 1.829 m (6')   Wt (!) 142.9 kg (315 lb)   BMI 42.72 kg/m²     Physical: On examination, the patient is alert and oriented x 3.  Examination of the left upper extremity reveals moderate hand swelling.  His incision is well-approximated.  There is no evidence of erythema or drainage.  He is neurovascularly intact in the left upper extremity.  He is able to flex and extend all digits without difficulty.  He has moderate stiffness.  Examination of the right upper extremity reveals moderate shoulder swelling.  He is able to lightly flex and extend the right elbow without difficulty.  He is able to flex and extend his right wrist without difficulty.  He is neurovascularly intact distally.  Examination of the skin reveals no lesions or ulcerations.  We were able to lightly range the right shoulder without difficulty.  The patient flexes the left wrist to 10 degrees.  He extends the left wrist to 10 degrees.  He does lack significant pronation and supination to the left forearm.    X-rays: 3 views of the left wrist obtained in the office today were

## 2025-05-07 NOTE — PLAN OF CARE
[facility] - Outpatient Rehabilitation and Therapy: [address], [place] [address] office: [phone] fax: [phone]     Physical Therapy Initial Evaluation Certification      Dear Mauricio Gayle MD ,    We had the pleasure of evaluating the following patient for physical therapy services at [facility].  A summary of our findings can be found in the initial assessment below.  This includes our plan of care.  If you have any questions or concerns regarding these findings, please do not hesitate to contact me at the office phone number listed above.  Thank you for the referral.     Physician Signature:_______________________________Date:__________________  By signing above (or electronic signature), therapist’s plan is approved by physician       Physical Therapy: TREATMENT/PROGRESS NOTE   Patient: Mihai Sharma (35 y.o. male)   Examination Date: 2025   :  1990 MRN: [id number]   Visit #: 1   Insurance Allowable Auth Needed   [place] [x]Yes    []No    Ins[place]: [place] / [place] / Product Type: *No Product type* /   Insurance ID: [id number] - ([place] [employer])  Secondary Insurance (if applicable):    Treatment Diagnosis:     ICD-10-CM    1. Decreased functional mobility  R26.89       2. Decreased shoulder mobility, right  M25.611       3. Weakness of right shoulder  R29.898       4. Decreased joint mobility of left wrist  M25.632       5. Left hand weakness  R29.898       6. Weakness of left upper extremity  R29.898          Medical Diagnosis:  S/P ORIF (open reduction internal fixation) fracture [Z98.890, Z87.81]  Closed fracture of proximal end of right humerus, unspecified fracture morphology, initial encounter [S42.201A]   Referring Physician: Mauricio Gayle MD  PCP: Harry Ferro MD     Plan of care signed (Y/N):     Date of Patient follow up with Physician:      Plan of Care Report: GUANACO

## 2025-05-08 ENCOUNTER — HOSPITAL ENCOUNTER (OUTPATIENT)
Dept: PHYSICAL THERAPY | Age: 35
Setting detail: THERAPIES SERIES
Discharge: HOME | End: 2025-05-08
Attending: ORTHOPAEDIC SURGERY
Payer: COMMERCIAL

## 2025-05-08 DIAGNOSIS — M25.632 DECREASED JOINT MOBILITY OF LEFT WRIST: ICD-10-CM

## 2025-05-08 DIAGNOSIS — M25.611 DECREASED SHOULDER MOBILITY, RIGHT: ICD-10-CM

## 2025-05-08 DIAGNOSIS — R29.898 WEAKNESS OF RIGHT SHOULDER: ICD-10-CM

## 2025-05-08 DIAGNOSIS — R29.898 LEFT HAND WEAKNESS: ICD-10-CM

## 2025-05-08 DIAGNOSIS — R26.89 DECREASED FUNCTIONAL MOBILITY: Primary | ICD-10-CM

## 2025-05-08 DIAGNOSIS — R29.898 WEAKNESS OF LEFT UPPER EXTREMITY: ICD-10-CM

## 2025-05-08 PROCEDURE — 97530 THERAPEUTIC ACTIVITIES: CPT

## 2025-05-08 PROCEDURE — 97161 PT EVAL LOW COMPLEX 20 MIN: CPT

## 2025-05-08 PROCEDURE — 97110 THERAPEUTIC EXERCISES: CPT

## 2025-05-12 ENCOUNTER — HOSPITAL ENCOUNTER (OUTPATIENT)
Dept: PHYSICAL THERAPY | Age: 35
Setting detail: THERAPIES SERIES
Discharge: HOME | End: 2025-05-12
Attending: ORTHOPAEDIC SURGERY
Payer: COMMERCIAL

## 2025-05-12 PROCEDURE — 97110 THERAPEUTIC EXERCISES: CPT

## 2025-05-12 PROCEDURE — 97140 MANUAL THERAPY 1/> REGIONS: CPT

## 2025-05-12 NOTE — FLOWSHEET NOTE
Table Support  - 2 x daily - 7 x weekly - 2 sets - 10 reps  - Flexion-Extension Shoulder Pendulum with Table Support  - 2 x daily - 7 x weekly - 2 sets - 10 reps  - Circular Shoulder Pendulum with Table Support  - 2 x daily - 7 x weekly - 2 sets - 10 reps  - Seated Shoulder Flexion Towel Slide at Table Top  - 2 x daily - 7 x weekly - 1-2 sets - 10 reps - 1 hold  - Seated Shoulder Abduction Towel Slide at Table Top  - 2 x daily - 7 x weekly - 1-2 sets - 10 reps - 1 hold  - Seated Wrist Flexion AROM  - 2-3 x daily - 7 x weekly - 2 sets - 10 reps - 1 hold  - Seated Finger MP Flexion AROM and Wrist Extension  - 2-3 x daily - 7 x weekly - 2 sets - 10 reps - 1 hold  - Seated Forearm Pronation and Supination AROM  - 2-3 x daily - 7 x weekly - 2 sets - 10 reps - 1 hold  The patient demonstrated good tolerance to new exercises, demonstrated and verbalized understanding of their home exercise program.  Written instructions were issued.       ASSESSMENT   Assessment:   Mihai Sharma is a 35 y.o. male presenting today to Outpatient PT with signs and symptoms consistent with right shoulder and left wrist dysfunction following a nondisplaced proximal humerus fracture and left wrist fracture s/p orif  .    Pt. presents with the functional impairments and activity limitations listed below and would benefit from Outpatient PT to address the below impairments as well as improve pain, and restore function.       Today's Assessment: During therapy this date, patient required verbal cueing, tactile cueing, and modification of technique for increasing ROM and allowing for proper ROM.Patient will continue to benefit from ongoing evaluation and advanced clinical decision from a Physical Therapist to address and improve pain control and ROM to safely return to PLOF without symptoms or restrictions.  He tolerated today's session fair to well.  Gradual gains with left wrist rom observed.      Medical Necessity Documentation:  I certify that  [place] Pregnancy And Lactation Text: There is insufficient data to evaluate drug-associated risk for major birth defects, miscarriage, or other adverse maternal or fetal outcomes.  There is a pregnancy registry that monitors pregnancy outcomes in pregnant persons exposed to the medication during pregnancy.  It is unknown if this medication is excreted in breast milk.  Do not breastfeed during treatment and for about 4 weeks after the last dose.

## 2025-05-14 ENCOUNTER — HOSPITAL ENCOUNTER (OUTPATIENT)
Dept: PHYSICAL THERAPY | Age: 35
Setting detail: THERAPIES SERIES
Discharge: HOME | End: 2025-05-14
Attending: ORTHOPAEDIC SURGERY
Payer: COMMERCIAL

## 2025-05-14 PROCEDURE — 97140 MANUAL THERAPY 1/> REGIONS: CPT

## 2025-05-14 PROCEDURE — 97110 THERAPEUTIC EXERCISES: CPT

## 2025-05-14 NOTE — FLOWSHEET NOTE
[facility] - Outpatient Rehabilitation and Therapy: [address], [address], [place] [address] office: [phone] fax: [phone]         Physical Therapy: TREATMENT/PROGRESS NOTE   Patient: Mihai Sharma (35 y.o. male)   Examination Date: 2025   :  1990 MRN: [id number]   Visit #: 3   Insurance Allowable Auth Needed   [employer] [x]Yes    []No    [place] / Product Type: *No Product type* /   Insurance ID: [id number] - ([place] [employer])  Secondary Insurance (if applicable):    Treatment Diagnosis:     ICD-10-CM    1. Decreased functional mobility  R26.89       2. Decreased shoulder mobility, right  M25.611       3. Weakness of right shoulder  R29.898       4. Decreased joint mobility of left wrist  M25.632       5. Left hand weakness  R29.898       6. Weakness of left upper extremity  R29.898          Medical Diagnosis:  S/P ORIF (open reduction internal fixation) fracture [Z98.890, Z87.81]  Closed fracture of proximal end of right humerus, unspecified fracture morphology, initial encounter [S42.201A]   Referring Physician: Mauricio Gayle MD  PCP: Harry Ferro MD     Plan of care signed (Y/N):     Date of Patient follow up with Physician:      Plan of Care Report: NO  POC update due: (10 visits /OR AUTH LIMITS, whichever is less)  2025                                             Medical History:  Comorbidities:  Diabetes (Type I or II)  Other: left eye surg. 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

## 2025-05-21 ENCOUNTER — HOSPITAL ENCOUNTER (OUTPATIENT)
Dept: PHYSICAL THERAPY | Age: 35
Setting detail: THERAPIES SERIES
Discharge: HOME | End: 2025-05-21
Attending: ORTHOPAEDIC SURGERY
Payer: COMMERCIAL

## 2025-05-21 PROCEDURE — 97110 THERAPEUTIC EXERCISES: CPT | Performed by: PHYSICAL THERAPIST

## 2025-05-21 PROCEDURE — 97140 MANUAL THERAPY 1/> REGIONS: CPT | Performed by: PHYSICAL THERAPIST

## 2025-05-21 NOTE — FLOWSHEET NOTE
sound                  Modalities:    No modalities applied this session    Education/Home Exercise Program: Access Code: [email or web address]  URL: [email or web address]  Date: 05/08/2025  Prepared by: Emmanuelle Max    Exercises  - Horizontal Shoulder Pendulum with Table Support  - 2 x daily - 7 x weekly - 2 sets - 10 reps  - Flexion-Extension Shoulder Pendulum with Table Support  - 2 x daily - 7 x weekly - 2 sets - 10 reps  - Circular Shoulder Pendulum with Table Support  - 2 x daily - 7 x weekly - 2 sets - 10 reps  - Seated Shoulder Flexion Towel Slide at Table Top  - 2 x daily - 7 x weekly - 1-2 sets - 10 reps - 1 hold  - Seated Shoulder Abduction Towel Slide at Table Top  - 2 x daily - 7 x weekly - 1-2 sets - 10 reps - 1 hold  - Seated Wrist Flexion AROM  - 2-3 x daily - 7 x weekly - 2 sets - 10 reps - 1 hold  - Seated Finger MP Flexion AROM and Wrist Extension  - 2-3 x daily - 7 x weekly - 2 sets - 10 reps - 1 hold  - Seated Forearm Pronation and Supination AROM  - 2-3 x daily - 7 x weekly - 2 sets - 10 reps - 1 hold  The patient demonstrated good tolerance to new exercises, demonstrated and verbalized understanding of their home exercise program.  Written instructions were issued.       ASSESSMENT   Assessment:   Mihai Sharma is a 35 y.o. male presenting today to Outpatient PT with signs and symptoms consistent with right shoulder and left wrist dysfunction following a nondisplaced proximal humerus fracture and left wrist fracture s/p orif  .    Pt. presents with the functional impairments and activity limitations listed below and would benefit from Outpatient PT to address the below impairments as well as improve pain, and restore function.       Today's Assessment: During therapy this date, patient required verbal cueing, tactile cueing, and progression of exercises and program for increasing ROM and allowing for proper ROM.Patient will continue to benefit from ongoing evaluation and advanced clinical decision

## 2025-05-29 ENCOUNTER — HOSPITAL ENCOUNTER (OUTPATIENT)
Dept: PHYSICAL THERAPY | Age: 35
Setting detail: THERAPIES SERIES
Discharge: HOME | End: 2025-05-29
Attending: ORTHOPAEDIC SURGERY
Payer: COMMERCIAL

## 2025-05-29 ENCOUNTER — OFFICE VISIT (OUTPATIENT)
Dept: ORTHOPEDIC SURGERY | Age: 35
End: 2025-05-29

## 2025-05-29 VITALS — HEIGHT: 72 IN | BODY MASS INDEX: 42.66 KG/M2 | WEIGHT: 315 LBS

## 2025-05-29 DIAGNOSIS — S52.502A CLOSED FRACTURE OF DISTAL END OF LEFT RADIUS, UNSPECIFIED FRACTURE MORPHOLOGY, INITIAL ENCOUNTER: ICD-10-CM

## 2025-05-29 DIAGNOSIS — Z87.81 S/P ORIF (OPEN REDUCTION INTERNAL FIXATION) FRACTURE: ICD-10-CM

## 2025-05-29 DIAGNOSIS — Z98.890 S/P ORIF (OPEN REDUCTION INTERNAL FIXATION) FRACTURE: ICD-10-CM

## 2025-05-29 DIAGNOSIS — S42.201A CLOSED FRACTURE OF PROXIMAL END OF RIGHT HUMERUS, UNSPECIFIED FRACTURE MORPHOLOGY, INITIAL ENCOUNTER: Primary | ICD-10-CM

## 2025-05-29 PROCEDURE — 97140 MANUAL THERAPY 1/> REGIONS: CPT

## 2025-05-29 PROCEDURE — 99024 POSTOP FOLLOW-UP VISIT: CPT | Performed by: ORTHOPAEDIC SURGERY

## 2025-05-29 PROCEDURE — 97110 THERAPEUTIC EXERCISES: CPT

## 2025-05-29 PROCEDURE — 97112 NEUROMUSCULAR REEDUCATION: CPT

## 2025-05-29 NOTE — PROGRESS NOTES
Mihai SANTIAGO Sharma  [id number]  May 29, 2025    Chief Complaint   Patient presents with    Follow-up     L wrist/ r shoulder        History: The patient is a 35-year-old gentleman who is here in follow-up regarding his right shoulder and his left wrist.  He underwent open reduction and internal fixation of his left distal radius approximately 2 months ago.  He also has a nondisplaced right proximal humerus fracture.  The right shoulder pain is improving.  The left wrist pain is improving.  He has been wearing a cock up wrist splint.  The patient reports very minimal pain in the left wrist.    The patient's  past medical history, medications, allergies,  family history, social history, and have been reviewed, and dated and are recorded in the chart.  Pertinent items are noted in HPI.  Review of systems reviewed from Pertinent History Form dated on 4/9 and available in the patient's chart under the Media tab.     Vitals:  Ht 1.829 m (6')   Wt (!) 142.9 kg (315 lb)   BMI 42.72 kg/m²     Physical: On examination, the patient is alert and oriented x 3.  Examination of the left upper extremity reveals moderate hand swelling.  His incision is well-approximated.  There is no evidence of erythema or drainage.  He is neurovascularly intact in the left upper extremity.  He is able to flex and extend all digits without difficulty.  He has moderate stiffness.  Examination of the right upper extremity reveals mild shoulder swelling.  He is able to lightly flex and extend the right elbow without difficulty.  He is able to flex and extend his right wrist without difficulty.  He is neurovascularly intact distally.  Examination of the skin reveals no lesions or ulcerations.  We were able to lightly range the right shoulder without difficulty.  We forward flexed the right shoulder to 100 degrees.  We abducted the right shoulder to 95 degrees.  The shoulder internally and externally rotates without difficulty.  The patient flexes the

## 2025-05-29 NOTE — FLOWSHEET NOTE
with physician  [] Other:     TREATMENT PLAN     Frequency/Duration: 2x/week for 8-10 weeks for the following treatment interventions:    Interventions:  Therapeutic Exercise (22556) including: strength training, ROM, and functional mobility  Therapeutic Activities (16044) including: functional mobility training and education.  Neuromuscular Re-education (47652) activation and proprioception, including postural re-education.    Manual Therapy (58519) as indicated to include: Passive Range of Motion, Gr I-IV mobilizations, Soft Tissue Mobilization, Trigger Point Release, and Myofascial Release  Modalities as needed that may include: Cryotherapy, Electrical Stimulation, and Thermal Agents  Patient education on joint protection, postural re-education, activity modification, and progression of HEP    Plan: Reassess left wrist/ forearm arom  Continue per POC as above to treat right shoulder and left wrist  See Dr Gayle's Plan of care  from 4/29/25        Electronically Signed by Miko Max, PT  Date: 05/29/2025     Note: Portions of this note have been templated and/or copied from initial evaluation, reassessments and prior notes for documentation efficiency.    Note: If patient does not return for scheduled/recommended follow up visits, this note will serve as a discharge from care along with the most recent update on progress.

## 2025-06-04 ENCOUNTER — HOSPITAL ENCOUNTER (OUTPATIENT)
Dept: PHYSICAL THERAPY | Age: 35
Setting detail: THERAPIES SERIES
Discharge: HOME | End: 2025-06-04
Attending: ORTHOPAEDIC SURGERY
Payer: COMMERCIAL

## 2025-06-04 PROCEDURE — 97110 THERAPEUTIC EXERCISES: CPT

## 2025-06-04 PROCEDURE — 97140 MANUAL THERAPY 1/> REGIONS: CPT

## 2025-06-04 PROCEDURE — 97112 NEUROMUSCULAR REEDUCATION: CPT

## 2025-06-04 NOTE — FLOWSHEET NOTE
Progressing: [] Met: [] Not Met: [] Adjusted      Overall Progression Towards Functional goals/ Treatment Progress Update:  [] Patient is progressing as expected towards functional goals listed.    [x] Progression is slowed due to complexities/Impairments listed.  [] Progression has been slowed due to co-morbidities.  [x] Plan just implemented, too soon (<30days) to assess goals progression   [] Goals require adjustment due to lack of progress  [] Patient is not progressing as expected and requires additional follow up with physician  [] Other:     TREATMENT PLAN     Frequency/Duration: 2x/week for 8-10 weeks for the following treatment interventions:    Interventions:  Therapeutic Exercise (62961) including: strength training, ROM, and functional mobility  Therapeutic Activities (62038) including: functional mobility training and education.  Neuromuscular Re-education (06078) activation and proprioception, including postural re-education.    Manual Therapy (77003) as indicated to include: Passive Range of Motion, Gr I-IV mobilizations, Soft Tissue Mobilization, Trigger Point Release, and Myofascial Release  Modalities as needed that may include: Cryotherapy, Electrical Stimulation, and Thermal Agents  Patient education on joint protection, postural re-education, activity modification, and progression of HEP    Plan: Reassess left wrist/ forearm arom  Continue per POC as above to treat right shoulder and left wrist  See Dr Gayle's Plan of care  from 4/29/25        Electronically Signed by Miko Max, PT  Date: 06/04/2025     Note: Portions of this note have been templated and/or copied from initial evaluation, reassessments and prior notes for documentation efficiency.    Note: If patient does not return for scheduled/recommended follow up visits, this note will serve as a discharge from care along with the most recent update on progress.

## 2025-06-11 ENCOUNTER — HOSPITAL ENCOUNTER (OUTPATIENT)
Dept: PHYSICAL THERAPY | Age: 35
Setting detail: THERAPIES SERIES
Discharge: HOME | End: 2025-06-11
Attending: ORTHOPAEDIC SURGERY
Payer: COMMERCIAL

## 2025-06-11 PROCEDURE — 97110 THERAPEUTIC EXERCISES: CPT

## 2025-06-11 PROCEDURE — 97140 MANUAL THERAPY 1/> REGIONS: CPT

## 2025-06-11 NOTE — FLOWSHEET NOTE
house but has a sister who comes by daily    Current Functional Limitations:    Functional Complaints:  can not drive a car, ride a bike, gamming, lifting, house and yard work       PLOF:  Pre-existing functional limitations include independent with all aspects of his life  Pt's sleep is affected?   YES    Occupation/School:  Work/School Status: Full time  Job Duties/Demands:       Hand Dominance: Left    Sport/ Recreation/ Leisure/ Hobbies: as above    Review Of Systems (ROS):  [x] Performed Review of systems (Integumentary, CardioPulmonary, Neurological) by intake and observation. Intake form is in the medical record. Patient has been instructed to contact their primary care physician regarding ROS issues if not already being addressed at this time.    [x] Patient history, allergies, meds reviewed. Medical chart reviewed. See intake form.     OBJECTIVE EXAMINATION     Left wrist/ forearm flexion extension RD UD Supination pronation   6/11/25    54 51 26 22 61 76                       ROM:  right   Date      Shldr flexion    Shldr abd  Shldr IR         Shldr ER   A P A P A P A P   Eval                        Strength: right   Date Shoulder flexion Shoulder abduction Shoulder IR Shoulder  ER Bicep   Eval                       5/7/25  ROM/Strength: (Blank cells denote NT) (*denotes increased pain)    Mvmt (norm) AROM L AROM R Notes PROM L PROM R Notes     CERVICAL Flex (60)        Ext (70)        SB(45)          Rotation (80)             SHOULDER Flexion (180)     126     Abduction (180)     105     ER -0          ER -90 (90)     70     IR -0          IR -90 (70)     50      ELBOW Flex/biceps (140)          Ext/triceps (0)          Pronation (80) 70         Supination (80) 0            WRIST Flexion (60) 15         Extension (60) 6         RD (20) 9         UD (20) 7          Makes full fist              MMT L MMT R Notes     CERVICAL Cerv flexion       Cerv extension       Cerv SB       Cerv

## 2025-06-13 ENCOUNTER — HOSPITAL ENCOUNTER (OUTPATIENT)
Dept: PHYSICAL THERAPY | Age: 35
Setting detail: THERAPIES SERIES
Discharge: HOME | End: 2025-06-13
Attending: ORTHOPAEDIC SURGERY
Payer: COMMERCIAL

## 2025-06-13 PROCEDURE — 97112 NEUROMUSCULAR REEDUCATION: CPT

## 2025-06-13 PROCEDURE — 97110 THERAPEUTIC EXERCISES: CPT

## 2025-06-13 PROCEDURE — 97140 MANUAL THERAPY 1/> REGIONS: CPT

## 2025-06-13 NOTE — FLOWSHEET NOTE
education on joint protection, postural re-education, activity modification, and progression of HEP    Plan: Add T band ex for right shoulder  HEP     Continue per POC as above to treat right shoulder and left wrist       See Dr Gayle's Plan of care  from 5/29/25 At this time, the patient will continue to work aggressively on range of motion of the left wrist, hand and forearm. He will also work on strengthening. He was instructed to monitor the catching. I do feel this is the extensor tendon catching over the ulnar aspect of the wrist. The patient will also work aggressively on range of motion and strengthening of the right shoulder. The patient was encouraged to avoid weightbearing through the upper extremities for the next month.         Electronically Signed by Miko Max, PT  Date: 06/13/2025     Note: Portions of this note have been templated and/or copied from initial evaluation, reassessments and prior notes for documentation efficiency.    Note: If patient does not return for scheduled/recommended follow up visits, this note will serve as a discharge from care along with the most recent update on progress.    
EKG/Labs

## 2025-06-17 ENCOUNTER — HOSPITAL ENCOUNTER (OUTPATIENT)
Dept: PHYSICAL THERAPY | Age: 35
Setting detail: THERAPIES SERIES
Discharge: HOME | End: 2025-06-17
Attending: ORTHOPAEDIC SURGERY
Payer: COMMERCIAL

## 2025-06-17 PROCEDURE — 97112 NEUROMUSCULAR REEDUCATION: CPT

## 2025-06-17 PROCEDURE — 97140 MANUAL THERAPY 1/> REGIONS: CPT

## 2025-06-17 PROCEDURE — 97110 THERAPEUTIC EXERCISES: CPT

## 2025-06-17 NOTE — FLOWSHEET NOTE
[facility] - Outpatient Rehabilitation and Therapy: [address], [address], [place] [address] office: [phone] fax: [phone]         Physical Therapy: TREATMENT/PROGRESS NOTE   Patient: Mihai Sharma (35 y.o. male)   Examination Date: 2025   :  1990 MRN: [id number]   Visit #: 9   Insurance Allowable Auth Needed   [place] 90 pcy hard max []Yes    [x]No    [place] / Product Type: *No Product type* /   Insurance ID: [id number] - ([place] [employer])  Secondary Insurance (if applicable):    Treatment Diagnosis:     ICD-10-CM    1. Decreased functional mobility  R26.89       2. Decreased shoulder mobility, right  M25.611       3. Weakness of right shoulder  R29.898       4. Decreased joint mobility of left wrist  M25.632       5. Left hand weakness  R29.898       6. Weakness of left upper extremity  R29.898          Medical Diagnosis:  S/P ORIF (open reduction internal fixation) fracture [Z98.890, Z87.81]  Closed fracture of proximal end of right humerus, unspecified fracture morphology, initial encounter [S42.201A]   Referring Physician: Mauricio Gayle MD  PCP: Harry Ferro MD     Plan of care signed (Y/N):     Date of Patient follow up with Physician:  25     Plan of Care Report: NO  POC update due: (10 visits /OR AUTH LIMITS, whichever is less)  2025                                             Medical History:  Comorbidities:  Diabetes (Type I or II)  Other: left eye surg. 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

## 2025-06-20 ENCOUNTER — HOSPITAL ENCOUNTER (OUTPATIENT)
Dept: PHYSICAL THERAPY | Age: 35
Setting detail: THERAPIES SERIES
Discharge: HOME | End: 2025-06-20
Attending: ORTHOPAEDIC SURGERY
Payer: COMMERCIAL

## 2025-06-20 PROCEDURE — 97112 NEUROMUSCULAR REEDUCATION: CPT

## 2025-06-20 PROCEDURE — 97110 THERAPEUTIC EXERCISES: CPT

## 2025-06-20 PROCEDURE — 97140 MANUAL THERAPY 1/> REGIONS: CPT
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
## 2025-06-24 ENCOUNTER — HOSPITAL ENCOUNTER (OUTPATIENT)
Dept: PHYSICAL THERAPY | Age: 35
Setting detail: THERAPIES SERIES
Discharge: HOME | End: 2025-06-24
Attending: ORTHOPAEDIC SURGERY
Payer: COMMERCIAL

## 2025-06-24 PROCEDURE — 97110 THERAPEUTIC EXERCISES: CPT

## 2025-06-24 PROCEDURE — 97140 MANUAL THERAPY 1/> REGIONS: CPT

## 2025-06-24 PROCEDURE — 97112 NEUROMUSCULAR REEDUCATION: CPT

## 2025-06-24 NOTE — FLOWSHEET NOTE
[facility] - Outpatient Rehabilitation and Therapy: [address], [place] [address] office: [phone] fax: [phone]    Physical Therapy Re-Certification Plan of Care    Dear Mauricio Gayle MD  ,    We had the pleasure of treating the following patient for physical therapy services at [facility] Outpatient Physical Therapy. A summary of our findings can be found in the updated assessment below.  This includes our plan of care.  If you have any questions or concerns regarding these findings, please do not hesitate to contact me at the office phone number checked above.  Thank you for the referral.     Physician Signature:________________________________Date:__________________  By signing above (or electronic signature), therapist's plan is approved by physician      Total Visits: 11     Overall Response to Treatment:  Patient is responding reasonably well to treatment and improvement is noted with regards to goals but the right supraspinatus is painful and weak with shoulder abduction (Positive Supraspinatus test).       Recommendation:    [x] Continue PT 2x / wk for 4-6 weeks.   [] Hold PT, pending MD visit   [] Discharge to [facility]. Follow up with PT or MD PRN.           Physical Therapy: TREATMENT/PROGRESS NOTE   Patient: Mihai Sharma (35 y.o. male)   Examination Date: 2025   :  1990 MRN: [id number]   Visit #: 11   Insurance Allowable Auth Needed   [place] 90 pcy hard max []Yes    [x]No    [place] / Product Type: *No Product type* /   Insurance ID: [id number] - ([place] BCBS)  Secondary Insurance (if applicable):    Treatment Diagnosis:     ICD-10-CM    1. Decreased functional mobility  R26.89       2. Decreased shoulder mobility, right  M25.611       3. Weakness of right shoulder  R29.898       4. Decreased joint mobility of left wrist  M25.632       5. Left hand weakness  R29.898       6. Weakness of left upper

## 2025-06-26 ENCOUNTER — OFFICE VISIT (OUTPATIENT)
Dept: ORTHOPEDIC SURGERY | Age: 35
End: 2025-06-26

## 2025-06-26 ENCOUNTER — HOSPITAL ENCOUNTER (OUTPATIENT)
Dept: PHYSICAL THERAPY | Age: 35
Setting detail: THERAPIES SERIES
Discharge: HOME | End: 2025-06-26
Attending: ORTHOPAEDIC SURGERY
Payer: COMMERCIAL

## 2025-06-26 VITALS — WEIGHT: 315 LBS | BODY MASS INDEX: 42.66 KG/M2 | HEIGHT: 72 IN

## 2025-06-26 DIAGNOSIS — Z87.81 S/P ORIF (OPEN REDUCTION INTERNAL FIXATION) FRACTURE: Primary | ICD-10-CM

## 2025-06-26 DIAGNOSIS — S42.201A CLOSED FRACTURE OF PROXIMAL END OF RIGHT HUMERUS, UNSPECIFIED FRACTURE MORPHOLOGY, INITIAL ENCOUNTER: ICD-10-CM

## 2025-06-26 DIAGNOSIS — Z98.890 S/P ORIF (OPEN REDUCTION INTERNAL FIXATION) FRACTURE: Primary | ICD-10-CM

## 2025-06-26 PROCEDURE — 99024 POSTOP FOLLOW-UP VISIT: CPT | Performed by: ORTHOPAEDIC SURGERY

## 2025-06-26 PROCEDURE — 97140 MANUAL THERAPY 1/> REGIONS: CPT

## 2025-06-26 PROCEDURE — 97110 THERAPEUTIC EXERCISES: CPT

## 2025-06-26 PROCEDURE — 97112 NEUROMUSCULAR REEDUCATION: CPT

## 2025-06-26 NOTE — PROGRESS NOTES
Mihai SANTIAGO Zachary  [id number]  June 26, 2025    Chief Complaint   Patient presents with    Post-Op Check     ORIF left wrist; DOS 4/2/25.    Follow-up     Right shoulder       History: The patient is a 35-year-old gentleman who is here in follow-up regarding his right shoulder and his left wrist.  He underwent open reduction and internal fixation of his left distal radius approximately 3 months ago.  He also has a nondisplaced right proximal humerus fracture.  The right shoulder pain is improving.  The left wrist pain is improving.  He has been wearing a cock up wrist splint.  The patient reports very minimal pain in the left wrist.  He is concerned about the right shoulder weakness.    The patient's  past medical history, medications, allergies,  family history, social history, and have been reviewed, and dated and are recorded in the chart.  Pertinent items are noted in HPI.  Review of systems reviewed from Pertinent History Form dated on 4/9 and available in the patient's chart under the Media tab.     Vitals:  Ht 1.829 m (6')   Wt (!) 142.9 kg (315 lb)   BMI 42.72 kg/m²     Physical: On examination, the patient is alert and oriented x 3.  Examination of the left upper extremity reveals mild hand swelling.  His incision is well-approximated.  There is no evidence of erythema or drainage.  He is neurovascularly intact in the left upper extremity.  He is able to flex and extend all digits without difficulty.  He has mild stiffness.  Examination of the right upper extremity reveals mild shoulder swelling.  He is able to lightly flex and extend the right elbow without difficulty.  He is able to flex and extend his right wrist without difficulty.  He is neurovascularly intact distally.  Examination of the skin reveals no lesions or ulcerations.  We were able to lightly range the right shoulder without difficulty.  We forward flexed the right shoulder to 160 degrees.  We abducted the right shoulder to 155 degrees.  The

## 2025-06-26 NOTE — FLOWSHEET NOTE
snapping a trash bag to open it up or reaching up and back. He reports less clicking at his left wrist.    6/24/25 He reports some increased soreness at his right shoulder and left wrist after installing some HDMI cables  into some electronic components over the weekend.    6/26/25 He reports his left wrist was sore this morning after a night not wearing the brace.  His right shoulder does not hurt unless he reaches the wrong way.         Test used Initial score  5/7/25 06/26/2025   Pain Summary VAS 4/10  0-1/10 at left wrist and   0/10 at R shoulder   Functional questionnaire Upper Extremity functional Scale 9 Taken    Other:              Pain:  Pain location: left wrist and right shoulder   Patient describes pain to be intermittent and Sharp, inconsistent is t&n and this did exist prior to injury  Pain decreases with: as above  Pain increases with: as above     Living status: he lives on his own in a house but has a sister who comes by daily    Current Functional Limitations:    Functional Complaints:  can not drive a car, ride a bike, gamming, lifting, house and yard work       PLOF:  Pre-existing functional limitations include independent with all aspects of his life  Pt's sleep is affected?   YES    Occupation/School:  Work/School Status: Full time  Job Duties/Demands:       Hand Dominance: Left    Sport/ Recreation/ Leisure/ Hobbies: as above    Review Of Systems (ROS):  [x] Performed Review of systems (Integumentary, CardioPulmonary, Neurological) by intake and observation. Intake form is in the medical record. Patient has been instructed to contact their primary care physician regarding ROS issues if not already being addressed at this time.    [x] Patient history, allergies, meds reviewed. Medical chart reviewed. See intake form.     OBJECTIVE EXAMINATION     Left wrist/ forearm flexion extension RD UD Supination pronation   6/11/25    54 51 26 22 61 76   6/26/25  59 65 29 26 86 84

## 2025-07-01 ENCOUNTER — HOSPITAL ENCOUNTER (OUTPATIENT)
Dept: PHYSICAL THERAPY | Age: 35
Setting detail: THERAPIES SERIES
Discharge: HOME | End: 2025-07-01
Attending: ORTHOPAEDIC SURGERY
Payer: COMMERCIAL

## 2025-07-01 PROCEDURE — 97112 NEUROMUSCULAR REEDUCATION: CPT

## 2025-07-01 PROCEDURE — 97110 THERAPEUTIC EXERCISES: CPT

## 2025-07-01 PROCEDURE — 97140 MANUAL THERAPY 1/> REGIONS: CPT

## 2025-07-01 NOTE — FLOWSHEET NOTE
[facility] - Outpatient Rehabilitation and Therapy: [address], [address], [place] [address] office: [phone] fax: [phone]           Physical Therapy: TREATMENT/PROGRESS NOTE   Patient: Mihai Sharma (35 y.o. male)   Examination Date: 2025   :  1990 MRN: [id number]   Visit #: 13   Insurance Allowable Auth Needed   [place] 90 pcy hard max []Yes    [x]No    [place] / Product Type: *No Product type* /   Insurance ID: [id number] - ([place] BCBS)  Secondary Insurance (if applicable):    Treatment Diagnosis:     ICD-10-CM    1. Decreased functional mobility  R26.89       2. Decreased shoulder mobility, right  M25.611       3. Weakness of right shoulder  R29.898       4. Decreased joint mobility of left wrist  M25.632       5. Left hand weakness  R29.898       6. Weakness of left upper extremity  R29.898          Medical Diagnosis:  S/P ORIF (open reduction internal fixation) fracture [Z98.890, Z87.81]  Closed fracture of proximal end of right humerus, unspecified fracture morphology, initial encounter [S42.201A]   Referring Physician: Mauricio Gayle MD  PCP: Harry Ferro MD     Plan of care signed (Y/N):     Date of Patient follow up with Physician:  25     Plan of Care Report: NO  POC update due: (10 visits /OR AUTH LIMITS, whichever is less)  2025                                             Medical History:  Comorbidities:  Diabetes (Type I or II)  Other: left eye surg. And low vision left eye  Relevant Medical History: right shoulder bone graft at 17 y/o/a as he had a benign tumor                                         Precautions/ Contra-indications:           Latex allergy:  NO  Pacemaker:    NO  Contraindications for Manipulation: recent fracture  Date of Surgery: as above  Other:    Red Flags:  None    Suicide Screening:   The patient did not verbalize a primary behavioral concern, suicidal ideation,

## 2025-07-08 ENCOUNTER — HOSPITAL ENCOUNTER (OUTPATIENT)
Dept: PHYSICAL THERAPY | Age: 35
Setting detail: THERAPIES SERIES
Discharge: HOME | End: 2025-07-08
Attending: ORTHOPAEDIC SURGERY
Payer: COMMERCIAL

## 2025-07-08 PROCEDURE — 97110 THERAPEUTIC EXERCISES: CPT

## 2025-07-08 PROCEDURE — 97112 NEUROMUSCULAR REEDUCATION: CPT

## 2025-07-08 PROCEDURE — 97140 MANUAL THERAPY 1/> REGIONS: CPT

## 2025-07-08 NOTE — FLOWSHEET NOTE
hypomobile on R    Gait:    Pattern: WNL  Assistive Device Used: no AD    Special Tests:  [] None Assessed   [] Following tests noted:    NT    Balance:  [x] WNL      [] NT       [] Dysfunction noted  Comment:     Falls Risk Assessment (30 days):   Falls Risk assessed and no intervention required.  Time Up and Go (TUG):   Not Assessed        Exercises/Interventions     Exercises/Interventions:   Therapeutic Ex (31404)  Min: Resistance/Reps X = completed this date, > = plan for future visit Cues/Notes   UBE L 6 @ 40-45 rpm's  x 2 min ea 4 total x    Wall ranger wax on/off RUE 20 x each  x    Wall slides RUE 3\" 10 x      Supination/ pronation  With hammer held at mid shaft  30 x >    Wrist flex./ ext 20 x ea   Post mobs   Flex bar gripping wrist flexion/ ext. Left hand vs right  3\" 10 x 2 each      Flex bar vertical left wrist ext. And flex.   20 x each     Cane flexion supine X 20      Gripper  L 5\"  3# 10 x   5\" 7# 10 x      Wrist ext. And flexion L  2# 10 x 3  each                                    HEP  x  ADDed to program   , see below   Manual Intervention  (17141)  Min:      mobilizations      DTM/ Right shoulder and left forearm/ wrist and scar (each gentle ), gr 2 wrist mobs, mfr to sub scap right,      Stretch Right shoulder IR's and adductors      P/aarom l Left wrist  flex., ext., rd, ud, forearm pronation and supination.    Right shoulder flex. , abd., Scaption, er and ir ,  x              Add cross body adduction    DTM  left forearm flexors/ extensors x    NMR re-education (03145)  Min:      Scapular muscle stabilization  T slide  Prone row        Standing t slide Y Red 3\" 10 x 2 x For posture    T slide row Black 10 x 3      Seated W (no money) Red 3\" 10 x 2  x    Rotator cuff activation         Sl'ing ER  2# 10 x 3      T slide IR, ER IR Black 10 x 3  ER  green  10 x 3 x    Open can in mirror for feedback in depressing humeral head     2# 5\" 10 x 2 x  Pt able to self correct         Therapeutic

## 2025-07-10 ENCOUNTER — HOSPITAL ENCOUNTER (OUTPATIENT)
Dept: PHYSICAL THERAPY | Age: 35
Setting detail: THERAPIES SERIES
Discharge: HOME | End: 2025-07-10
Attending: ORTHOPAEDIC SURGERY
Payer: COMMERCIAL

## 2025-07-10 PROCEDURE — 97140 MANUAL THERAPY 1/> REGIONS: CPT

## 2025-07-10 PROCEDURE — 97110 THERAPEUTIC EXERCISES: CPT

## 2025-07-10 PROCEDURE — 97112 NEUROMUSCULAR REEDUCATION: CPT

## 2025-07-10 NOTE — FLOWSHEET NOTE
26 22 61 76   6/26/25  59 65 29 26 86 84   7/10/25  65 84 28 30 90 90     ROM:  right   Date      Shldr flexion    Shldr abd  Shldr IR         Shldr ER   A P A P A P A P   Eval           6/13 149 165 70 162 76  97    7/10/25 165  90 162 80  97      Strength: right   Date Shoulder flexion Shoulder abduction Shoulder IR Shoulder  ER Bicep   Eval        6/13/25 3+/5 3-/5 4/5 4/5    7/1/25  4-/5 4-/5 4+/5 4/5    6/13/25  Special test Right shoulder positive supraspinatus test.         5/7/25  ROM/Strength: (Blank cells denote NT) (*denotes increased pain)    Mvmt (norm) AROM L AROM R Notes PROM L PROM R Notes     CERVICAL Flex (60)        Ext (70)        SB(45)          Rotation (80)             SHOULDER Flexion (180)     126     Abduction (180)     105     ER -0          ER -90 (90)     70     IR -0          IR -90 (70)     50      ELBOW Flex/biceps (140)          Ext/triceps (0)          Pronation (80) 70         Supination (80) 0            WRIST Flexion (60) 15         Extension (60) 6         RD (20) 9         UD (20) 7          Makes full fist              MMT L MMT R Notes     CERVICAL Cerv flexion       Cerv extension       Cerv SB       Cerv rotation          SHOULDER Flexion       Abduction       ER -0       ER -90       IR -0       IR -90        ELBOW Flex/biceps       Ext/triceps       Pronation       supination          WRIST Flexion       Extension       RD       UD                 Palpation:   Patient reported tenderness with palpation  Location:tender at hermelindo surgical scar and theft thumb       Posture:   WNL    Bandages/Dressings/Incisions:  Patients wound/incision appears to be healing as expected  No signs or symptoms indicating infection including: abnormal warmth/heat, streaking redness, pus/colored discharge, or odor    Dermatomes: Abnormal findings listed below  NT    Myotomes: Abnormal findings listed below  NT    Reflexes: Abnormal findings listed below  Not Tested    Specific Joint

## 2025-07-15 ENCOUNTER — HOSPITAL ENCOUNTER (OUTPATIENT)
Dept: PHYSICAL THERAPY | Age: 35
Setting detail: THERAPIES SERIES
Discharge: HOME | End: 2025-07-15
Attending: ORTHOPAEDIC SURGERY
Payer: COMMERCIAL

## 2025-07-15 PROCEDURE — 97112 NEUROMUSCULAR REEDUCATION: CPT

## 2025-07-15 PROCEDURE — 97140 MANUAL THERAPY 1/> REGIONS: CPT

## 2025-07-15 PROCEDURE — 97110 THERAPEUTIC EXERCISES: CPT

## 2025-07-15 NOTE — FLOWSHEET NOTE
35 y.o. male presenting today to Outpatient PT with signs and symptoms consistent with right shoulder and left wrist dysfunction following a nondisplaced proximal humerus fracture and left wrist fracture s/p orif  .    Pt. presents with the functional impairments and activity limitations listed below and would benefit from Outpatient PT to address the below impairments as well as improve pain, and restore function.       Today's Assessment: Patient had good tolerance to today's session, reporting improved ROM, appropriate fatigue, muscular fatigue, and right shoulder discomfort with abduction. with program completed. Able to progress  resistance on all exercises. Continues to display deficits in stiffness and weakness which required ongoing skilled physical therapy and decision making.   Improved right shoulder flexion, right shoulder strength with the exception of abduction beyond 90.  Left wrist arom is wnl's and wrist is improving as well.  Probable defect in right supraspinatus based on clinical findings.    Medical Necessity Documentation:  I certify that this patient meets the below criteria necessary for medical necessity for care and/or justification of therapy services:  The patient has functional impairments and/or activity limitations and would benefit from continued outpatient therapy services to address the deficits outlined in the patients goals  The patient has a musculoskeletal condition(s) with a corresponding ICD-10 code that is of complexity and severity that require skilled therapeutic intervention. This has a direct and significant impact on the need for therapy and significantly impacts the rate of recovery.     Return to Play: NA    Prognosis for POC: [x] Good [] Fair  [] Poor    Patient requires continued skilled intervention: [x] Yes  [] No      CHARGE CAPTURE     PT CHARGE GRID   CPT Code (TIMED) minutes # CPT Code (UNTIMED) #     Therex (32259)  15 1  EVAL:LOW (89068 - Typically 20 minutes

## 2025-07-18 ENCOUNTER — HOSPITAL ENCOUNTER (OUTPATIENT)
Dept: PHYSICAL THERAPY | Age: 35
Setting detail: THERAPIES SERIES
Discharge: HOME | End: 2025-07-18
Attending: ORTHOPAEDIC SURGERY
Payer: COMMERCIAL

## 2025-07-18 PROCEDURE — 97112 NEUROMUSCULAR REEDUCATION: CPT

## 2025-07-18 PROCEDURE — 97110 THERAPEUTIC EXERCISES: CPT

## 2025-07-18 PROCEDURE — 97140 MANUAL THERAPY 1/> REGIONS: CPT

## 2025-07-22 ENCOUNTER — HOSPITAL ENCOUNTER (OUTPATIENT)
Dept: PHYSICAL THERAPY | Age: 35
Setting detail: THERAPIES SERIES
Discharge: HOME | End: 2025-07-22
Attending: ORTHOPAEDIC SURGERY
Payer: COMMERCIAL

## 2025-07-22 PROCEDURE — 97112 NEUROMUSCULAR REEDUCATION: CPT

## 2025-07-22 PROCEDURE — 97110 THERAPEUTIC EXERCISES: CPT

## 2025-07-22 PROCEDURE — 97140 MANUAL THERAPY 1/> REGIONS: CPT

## 2025-07-22 NOTE — FLOWSHEET NOTE
strength right shoulder and left wrist as tolerated.          Electronically Signed by Miko Max, PT  Date: 07/22/2025     Note: Portions of this note have been templated and/or copied from initial evaluation, reassessments and prior notes for documentation efficiency.    Note: If patient does not return for scheduled/recommended follow up visits, this note will serve as a discharge from care along with the most recent update on progress.

## 2025-07-24 ENCOUNTER — HOSPITAL ENCOUNTER (OUTPATIENT)
Dept: PHYSICAL THERAPY | Age: 35
Setting detail: THERAPIES SERIES
Discharge: HOME | End: 2025-07-24
Attending: ORTHOPAEDIC SURGERY
Payer: COMMERCIAL

## 2025-07-24 PROCEDURE — 97112 NEUROMUSCULAR REEDUCATION: CPT

## 2025-07-24 PROCEDURE — 97110 THERAPEUTIC EXERCISES: CPT

## 2025-07-24 PROCEDURE — 97140 MANUAL THERAPY 1/> REGIONS: CPT

## 2025-07-24 NOTE — PLAN OF CARE
[facility] - Outpatient Rehabilitation and Therapy: [address], [place] [address] office: [phone] fax: [phone]     Physical Therapy Re-Certification Plan of Care    Dear Mauricio Gayle MD  ,    We had the pleasure of treating the following patient for physical therapy services at [facility]. A summary of our findings can be found in the updated assessment below.  This includes our plan of care.  If you have any questions or concerns regarding these findings, please do not hesitate to contact me at the office phone number checked above.  Thank you for the referral.     Physician Signature:________________________________Date:__________________  By signing above (or electronic signature), therapist's plan is approved by physician      Total Visits: 19     Overall Response to Treatment:  Patient is responding well to treatment and improvement is noted with regards to goals ,     Recommendation:    [x] Continue PT 1x / 1-2 weeks for 6 weeks than DC to HEP.  Supraspinatus strength is improving in scapular plan but remains weak in Coronal plane, gradual gains being made with tolerance of left wrist/ hand with weight bearing.    [] Hold PT, pending MD visit   [] Discharge to [facility]. Follow up with PT or MD PRN.        Physical Therapy: TREATMENT/PROGRESS NOTE   Patient: Mihai Sharma (35 y.o. male)   Examination Date: 2025   :  1990 MRN: [id number]   Visit #: 19   Insurance Allowable Auth Needed   [place] 90 pcy hard max []Yes    [x]No    [place] / Product Type: *No Product type* /   Insurance ID: [id number] - ([place] BCBS)  Secondary Insurance (if applicable):    Treatment Diagnosis:     ICD-10-CM    1. Decreased functional mobility  R26.89       2. Decreased shoulder mobility, right  M25.611       3. Weakness of right shoulder  R29.898       4. Decreased joint mobility of left wrist  M25.632

## 2025-07-29 ENCOUNTER — HOSPITAL ENCOUNTER (OUTPATIENT)
Dept: PHYSICAL THERAPY | Age: 35
Setting detail: THERAPIES SERIES
Discharge: HOME | End: 2025-07-29
Attending: ORTHOPAEDIC SURGERY
Payer: COMMERCIAL

## 2025-07-29 ENCOUNTER — OFFICE VISIT (OUTPATIENT)
Dept: ORTHOPEDIC SURGERY | Age: 35
End: 2025-07-29
Payer: COMMERCIAL

## 2025-07-29 VITALS — BODY MASS INDEX: 42.66 KG/M2 | WEIGHT: 315 LBS | HEIGHT: 72 IN

## 2025-07-29 DIAGNOSIS — M25.511 ACUTE PAIN OF RIGHT SHOULDER: Primary | ICD-10-CM

## 2025-07-29 PROCEDURE — 99213 OFFICE O/P EST LOW 20 MIN: CPT | Performed by: ORTHOPAEDIC SURGERY

## 2025-07-29 PROCEDURE — 97112 NEUROMUSCULAR REEDUCATION: CPT

## 2025-07-29 PROCEDURE — 97110 THERAPEUTIC EXERCISES: CPT

## 2025-07-29 NOTE — PROGRESS NOTES
Mihai SANTIAGO Zachary  [id number]  July 29, 2025    Chief Complaint   Patient presents with    Follow-up     Rt shoulder       History: The patient is a 35-year-old gentleman who is here in follow-up regarding his right shoulder and his left wrist.  He underwent open reduction and internal fixation of his left distal radius approximately 4 months ago.  He also has a nondisplaced right proximal humerus fracture.  The patient is having very mild right shoulder pain.  He is quite frustrated with his weakness.  He has difficulty with overhead activities.  The patient did have a bone graft procedure involving the right proximal humerus in the very remote past.  Reports no issues with his left wrist.  He is quite pleased with his results.  He is no longer wearing a brace for the left wrist.    The patient's  past medical history, medications, allergies,  family history, social history, and have been reviewed, and dated and are recorded in the chart.  Pertinent items are noted in HPI.  Review of systems reviewed from Pertinent History Form dated on 4/9 and available in the patient's chart under the Media tab.     Vitals:  Ht 1.829 m (6')   Wt (!) 142.9 kg (315 lb)   BMI 42.72 kg/m²     Physical: On examination, the patient is alert and oriented x 3.  Examination of the left upper extremity reveals no hand swelling.  His incision is well-approximated.  There is no evidence of erythema or drainage.  He is neurovascularly intact in the left upper extremity.  He is able to flex and extend all digits without difficulty.  He flexes and extends the wrist without difficulty.   Examination of the right upper extremity reveals mild shoulder swelling.  He is able to lightly flex and extend the right elbow without difficulty.  He is able to flex and extend his right wrist without difficulty.  He is neurovascularly intact distally.  Examination of the skin reveals no lesions or ulcerations.  We were able to lightly range the right shoulder

## 2025-07-29 NOTE — FLOWSHEET NOTE
[facility] - Outpatient Rehabilitation and Therapy: [address], [address], [place] [address] office: [phone] fax: [phone]        Physical Therapy: TREATMENT/PROGRESS NOTE   Patient: Mihai Sharma (35 y.o. male)   Examination Date: 2025   :  1990 MRN: [id number]   Visit #: 20   Insurance Allowable Auth Needed   [place] 90 pcy hard max []Yes    [x]No    [place] / Product Type: *No Product type* /   Insurance ID: [id number] - ([place] [employer])  Secondary Insurance (if applicable):    Treatment Diagnosis:     ICD-10-CM    1. Decreased functional mobility  R26.89       2. Decreased shoulder mobility, right  M25.611       3. Weakness of right shoulder  R29.898       4. Decreased joint mobility of left wrist  M25.632       5. Left hand weakness  R29.898       6. Weakness of left upper extremity  R29.898          Medical Diagnosis:  S/P ORIF (open reduction internal fixation) fracture [Z98.890, Z87.81]  Closed fracture of proximal end of right humerus, unspecified fracture morphology, initial encounter [S42.201A]   Referring Physician: Mauricio Gayle MD  PCP: Harry Ferro MD     Plan of care signed (Y/N):     Date of Patient follow up with Physician:  25     Plan of Care Report: NO  POC update due: (10 visits /OR AUTH LIMITS, whichever is less)  2025                                             Medical History:  Comorbidities:  Diabetes (Type I or II)  Other: left eye surg. 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

## 2025-07-31 ENCOUNTER — HOSPITAL ENCOUNTER (OUTPATIENT)
Dept: PHYSICAL THERAPY | Age: 35
Setting detail: THERAPIES SERIES
Discharge: HOME | End: 2025-07-31
Attending: ORTHOPAEDIC SURGERY
Payer: COMMERCIAL

## 2025-07-31 PROCEDURE — 97112 NEUROMUSCULAR REEDUCATION: CPT

## 2025-07-31 PROCEDURE — 97110 THERAPEUTIC EXERCISES: CPT

## 2025-07-31 PROCEDURE — 97140 MANUAL THERAPY 1/> REGIONS: CPT

## 2025-07-31 NOTE — FLOWSHEET NOTE
[facility] - Outpatient Rehabilitation and Therapy: [address], [address], [place] [address] office: [phone] fax: [phone]        Physical Therapy: TREATMENT/PROGRESS NOTE   Patient: Mihai Sharma (35 y.o. male)   Examination Date: 2025   :  1990 MRN: [id number]   Visit #: 21   Insurance Allowable Auth Needed   [place] 90 pcy hard max []Yes    [x]No    [place] / Product Type: *No Product type* /   Insurance ID: [id number] - ([place] [employer]BS)  Secondary Insurance (if applicable):    Treatment Diagnosis:     ICD-10-CM    1. Decreased functional mobility  R26.89       2. Decreased shoulder mobility, right  M25.611       3. Weakness of right shoulder  R29.898       4. Decreased joint mobility of left wrist  M25.632       5. Left hand weakness  R29.898       6. Weakness of left upper extremity  R29.898          Medical Diagnosis:  S/P ORIF (open reduction internal fixation) fracture [Z98.890, Z87.81]  Closed fracture of proximal end of right humerus, unspecified fracture morphology, initial encounter [S42.201A]   Referring Physician: Mauricio Gayle MD  PCP: Harry Ferro MD     Plan of care signed (Y/N):     Date of Patient follow up with Physician:  25     Plan of Care Report: NO  POC update due: (10 visits /OR AUTH LIMITS, whichever is less)  2025                                             Medical History:  Comorbidities:  Diabetes (Type I or II)  Other: left eye surg. 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

## 2025-08-11 ENCOUNTER — HOSPITAL ENCOUNTER (OUTPATIENT)
Dept: MRI IMAGING | Age: 35
Discharge: HOME | End: 2025-08-11
Attending: ORTHOPAEDIC SURGERY
Payer: COMMERCIAL

## 2025-08-11 DIAGNOSIS — M25.511 ACUTE PAIN OF RIGHT SHOULDER: ICD-10-CM

## 2025-08-11 PROCEDURE — 73221 MRI JOINT UPR EXTREM W/O DYE: CPT

## 2025-08-15 ENCOUNTER — OFFICE VISIT (OUTPATIENT)
Dept: ORTHOPEDIC SURGERY | Age: 35
End: 2025-08-15

## 2025-08-15 VITALS — BODY MASS INDEX: 42.66 KG/M2 | WEIGHT: 315 LBS | HEIGHT: 72 IN

## 2025-08-15 DIAGNOSIS — M75.81 ROTATOR CUFF TENDINITIS, RIGHT: Primary | ICD-10-CM

## 2025-08-15 DIAGNOSIS — M19.011 ARTHRITIS OF RIGHT ACROMIOCLAVICULAR JOINT: ICD-10-CM

## 2025-08-15 RX ORDER — BUPIVACAINE HYDROCHLORIDE 2.5 MG/ML
3 INJECTION, SOLUTION INFILTRATION; PERINEURAL ONCE
Status: COMPLETED | OUTPATIENT
Start: 2025-08-15 | End: 2025-08-15

## 2025-08-15 RX ORDER — TRIAMCINOLONE ACETONIDE 40 MG/ML
80 INJECTION, SUSPENSION INTRA-ARTICULAR; INTRAMUSCULAR ONCE
Status: COMPLETED | OUTPATIENT
Start: 2025-08-15 | End: 2025-08-15

## 2025-08-15 RX ADMIN — TRIAMCINOLONE ACETONIDE 80 MG: 40 INJECTION, SUSPENSION INTRA-ARTICULAR; INTRAMUSCULAR at 11:31

## 2025-08-15 RX ADMIN — BUPIVACAINE HYDROCHLORIDE 7.5 MG: 2.5 INJECTION, SOLUTION INFILTRATION; PERINEURAL at 11:31

## 2025-08-18 ENCOUNTER — HOSPITAL ENCOUNTER (OUTPATIENT)
Dept: PHYSICAL THERAPY | Age: 35
Setting detail: THERAPIES SERIES
Discharge: HOME | End: 2025-08-18
Attending: ORTHOPAEDIC SURGERY
Payer: COMMERCIAL

## 2025-08-18 PROCEDURE — 97140 MANUAL THERAPY 1/> REGIONS: CPT

## 2025-08-18 PROCEDURE — 97110 THERAPEUTIC EXERCISES: CPT

## 2025-08-18 PROCEDURE — 97112 NEUROMUSCULAR REEDUCATION: CPT

## (undated) DEVICE — 4-PORT MANIFOLD: Brand: NEPTUNE 2

## (undated) DEVICE — Device

## (undated) DEVICE — SOLUTION IRRIG 1000ML 09% SOD CHL USP PIC PLAS CONTAINER

## (undated) DEVICE — 1010 S-DRAPE TOWEL DRAPE 10/BX: Brand: STERI-DRAPE™

## (undated) DEVICE — GLOVE SURG SZ 85 L12IN FNGR THK79MIL GRN LTX FREE

## (undated) DEVICE — HAND AND ELBOW: Brand: MEDLINE INDUSTRIES, INC.

## (undated) DEVICE — INTENDED FOR TISSUE SEPARATION, AND OTHER PROCEDURES THAT REQUIRE A SHARP SURGICAL BLADE TO PUNCTURE OR CUT.: Brand: BARD-PARKER ® STAINLESS STEEL BLADES

## (undated) DEVICE — BANDAGE COMPR W3INXL5YD BGE POLY COT E RECOVERABLE BRTH W/

## (undated) DEVICE — BANDAGE COMPR W4INXL10YD WHITE/BEIGE E MTRX HK LOOP CLSR

## (undated) DEVICE — TRANSFER SET 3": Brand: MEDLINE INDUSTRIES, INC.

## (undated) DEVICE — UNTHREADED GUIDE WIRE: Brand: FIXOS

## (undated) DEVICE — DRAPE C ARM W/ POLY STRP W42XL72IN FOR MOB XR

## (undated) DEVICE — SUTURE NONABSORBABLE MONOFILAMENT 4-0 PS-2 18 IN BLK ETHILON 1667G

## (undated) DEVICE — SPLINT ORTH W3XL15IN PLSTR OF PARIS LO EXOTHERM SMOOTH

## (undated) DEVICE — SUTURE VICRYL + SZ 2-0 L36IN ABSRB UD L36MM CT-1 1/2 CIR VCP945H

## (undated) DEVICE — SUTURE VICRYL + 1 L27IN ABSRB UD CT-1 L36MM 1/2 CIR TAPR PNT VCP261H

## (undated) DEVICE — SUTURE NONABSORBABLE MONOFILAMENT 4-0 FS-2 18 IN ETHILON 662H

## (undated) DEVICE — ELECTRODE PT RET AD L9FT HI MOIST COND ADH HYDRGEL CORDED

## (undated) DEVICE — GLOVE SURG SZ 85 L12IN FNGR ORTHO 126MIL CRM LTX FREE

## (undated) DEVICE — DRESSING,GAUZE,XEROFORM,CURAD,1"X8",ST: Brand: CURAD

## (undated) DEVICE — MERCY HEALTH WEST TURNOVER: Brand: MEDLINE INDUSTRIES, INC.

## (undated) DEVICE — NEPTUNE E-SEP SMOKE EVACUATION PENCIL, COATED, 70MM BLADE, PUSH BUTTON SWITCH: Brand: NEPTUNE E-SEP

## (undated) DEVICE — SYRINGE IRRIG 60ML SFT PLIABLE BLB EZ TO GRP 1 HND USE W/

## (undated) DEVICE — PADDING,UNDERCAST,COTTON, 4"X4YD STERILE: Brand: MEDLINE

## (undated) DEVICE — PADDING CAST W6INXL4YD COT LO LINTING WYTEX

## (undated) DEVICE — SUTURE VICRYL + SZ 2-0 L27IN ABSRB CLR CT-1 1/2 CIR TAPERCUT VCP259H